# Patient Record
Sex: FEMALE | Race: BLACK OR AFRICAN AMERICAN | NOT HISPANIC OR LATINO | Employment: PART TIME | URBAN - METROPOLITAN AREA
[De-identification: names, ages, dates, MRNs, and addresses within clinical notes are randomized per-mention and may not be internally consistent; named-entity substitution may affect disease eponyms.]

---

## 2017-04-20 ENCOUNTER — THERAPY VISIT (OUTPATIENT)
Dept: PHYSICAL THERAPY | Facility: CLINIC | Age: 37
End: 2017-04-20
Payer: COMMERCIAL

## 2017-04-20 DIAGNOSIS — M79.671 RIGHT FOOT PAIN: Primary | ICD-10-CM

## 2017-04-20 PROCEDURE — 97161 PT EVAL LOW COMPLEX 20 MIN: CPT | Mod: GP | Performed by: PHYSICAL THERAPIST

## 2017-04-20 PROCEDURE — 97110 THERAPEUTIC EXERCISES: CPT | Mod: GP | Performed by: PHYSICAL THERAPIST

## 2017-04-20 NOTE — PROGRESS NOTES
[facility] Initial Evaluation    Subjective:    Patient is a 37 year old female presenting with rehab right ankle/foot hpi. The history is provided by the patient. A  was used.   Arielle Paul is a 37 year old female with a right foot condition.  Condition occurred with:  Insidious onset.  Condition occurred: for unknown reasons.    Pain began about 2 months ago. PT referral on 4/5/17.      Radiates to:  No radiation.  Pain is described as sharp and is intermittent and reported as 8/10 (at worst).   Pain is worse in the A.M..  Symptoms are exacerbated by walking, weight bearing and standing and relieved by activity/movement and rest.  Since onset symptoms are gradually worsening.  Special tests:  X-ray (negative for fracture).      General health as reported by patient is excellent.  Pertinent medical history includes:  None and multiple sclerosis.  Medical allergies: no.  Other surgeries include:  None reported.  Current medications:  None as reported by patient.  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  Primary job tasks include:  Repetitive tasks, prolonged standing and lifting.    Barriers include:  None as reported by patient.    Red flags:  None as reported by patient.                        Objective:    Standing Alignment:                Ankle/Foot:  Pes planus L and pes planus R        Flexibility/Screens:       Lower Extremity:      Decreased right lower extremity flexibility:  Gastroc and Soleus          Ankle/Foot Evaluation  ROM:  AROM is normal.      Strength is normal.  LIGAMENT TESTING: normal              SPECIAL TESTS: normal    PALPATION:     Right ankle tenderness present at:   gastroc/soleus and plantar fascia  EDEMA: normal          MOBILITY TESTING: normal                                                          Knee Evaluation:  ROM:  AROM: normal  Strength:   Normal                            General     ROS    Assessment/Plan:      Patient is a 37 year old female with R foot complaints.    Patient has the following significant findings with corresponding treatment plan.                Diagnosis 1:  R plantar fasciitis  Pain -  hot/cold therapy, US, manual therapy, splint/taping/bracing/orthotics, self management, education and home program  Decreased ROM/flexibility - manual therapy, therapeutic exercise and home program  Decreased strength - therapeutic exercise, therapeutic activities and home program    Therapy Evaluation Codes:   1) History comprised of:   Personal factors that impact the plan of care:      None.    Comorbidity factors that impact the plan of care are:      None.     Medications impacting care: None.  2) Examination of Body Systems comprised of:   Body structures and functions that impact the plan of care:      Ankle, Knee and foot.   Activity limitations that impact the plan of care are:      Standing and Walking.  3) Clinical presentation characteristics are:   Stable/Uncomplicated.  4) Decision-Making    Low complexity using standardized patient assessment instrument and/or measureable assessment of functional outcome.  Cumulative Therapy Evaluation is: Low complexity.    Previous and current functional limitations:  (See Goal Flow Sheet for this information)    Short term and Long term goals: (See Goal Flow Sheet for this information)     Communication ability:  Patient has an  for communication clarity.  Treatment Explanation - The following has been discussed with the patient:   RX ordered/plan of care  Anticipated outcomes  Possible risks and side effects  This patient would benefit from PT intervention to resume normal activities.   Rehab potential is good.    Frequency:  1 X week, once daily  Duration:  for 6 weeks  Discharge Plan:  Achieve all LTG.  Independent in home treatment program.  Reach maximal therapeutic  benefit.    Please refer to the daily flowsheet for treatment today, total treatment time and time spent performing 1:1 timed codes.

## 2017-04-20 NOTE — LETTER
[facility]  [address]  [place] [address]  [phone]    2017    Re: Arielle Paul   :   1980  MRN:  [id number]   REFERRING PHYSICIAN:   Dinesh Andino  [facility]  Date of Initial Evaluation:2017  Visits:1  Rxs Used: 1  Reason for Referral:  Right foot pain  EVALUATION SUMMARY  [facility] Initial Evaluation  Subjective:  Patient is a 37 year old female presenting with rehab right ankle/foot hpi. The history is provided by the patient. A  was used.   Arielle Paul is a 37 year old female with a right foot condition.  Condition occurred with:  Insidious onset.  Condition occurred: for unknown reasons.    Pain began about 2 months ago. PT referral on 17.    Radiates to:  No radiation.  Pain is described as sharp and is intermittent and reported as 8/10 (at worst).   Pain is worse in the A.M..  Symptoms are exacerbated by walking, weight bearing and standing and relieved by activity/movement and rest.  Since onset symptoms are gradually worsening.  Special tests:  X-ray (negative for fracture).      General health as reported by patient is excellent.  Pertinent medical history includes:  None and multiple sclerosis.  Medical allergies: no.  Other surgeries include:  None reported.  Current medications:  None as reported by patient.  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  Primary job tasks include:  Repetitive tasks, prolonged standing and lifting.  Barriers include:  None as reported by patient.  Red flags:  None as reported by patient.  Objective:  Standing Alignment:    Ankle/Foot:  Pes planus L and pes planus R  Flexibility/Screens:   Lower Extremity:  Decreased right lower extremity flexibility:  Gastroc and Soleus  Ankle/Foot Evaluation  ROM:  AROM is normal.  Strength is  normal.  LIGAMENT TESTING: normal  SPECIAL TESTS: normal  PALPATION:   Right ankle tenderness present at:   gastroc/soleus and plantar fascia  EDEMA: normal     MOBILITY TESTING: normal  Knee Evaluation:  ROM:  AROM: normal  Strength:  Normal  Re: Arielle Paul   :   1980  General   ROS  Assessment/Plan:    Patient is a 37 year old female with R foot complaints.    Patient has the following significant findings with corresponding treatment plan.                Diagnosis 1:  R plantar fasciitis  Pain -  hot/cold therapy, US, manual therapy, splint/taping/bracing/orthotics, self management, education and home program  Decreased ROM/flexibility - manual therapy, therapeutic exercise and home program  Decreased strength - therapeutic exercise, therapeutic activities and home program  Therapy Evaluation Codes:   1) History comprised of:   Personal factors that impact the plan of care:      None.    Comorbidity factors that impact the plan of care are:      None.     Medications impacting care: None.  2) Examination of Body Systems comprised of:   Body structures and functions that impact the plan of care:      Ankle, Knee and foot.   Activity limitations that impact the plan of care are:      Standing and Walking.  3) Clinical presentation characteristics are:   Stable/Uncomplicated.  4) Decision-Making    Low complexity using standardized patient assessment instrument and/or measureable assessment of functional outcome.  Cumulative Therapy Evaluation is: Low complexity.  Previous and current functional limitations:  (See Goal Flow Sheet for this information)    Short term and Long term goals: (See Goal Flow Sheet for this information)   Communication ability:  Patient has an  for communication clarity.  Treatment Explanation - The following has been discussed with the patient:   RX ordered/plan of care  Anticipated outcomes  Possible risks and side effects  This patient would benefit from PT  intervention to resume normal activities.   Rehab potential is good.  Frequency:  1 X week, once daily  Duration:  for 6 weeks  Discharge Plan:  Achieve all LTG.  Independent in home treatment program.  Reach maximal therapeutic benefit.  Thank you for your referral.  INQUIRIES  Therapist: Dana Alanis DPT   [facility]  [address] [facility] [address]  Phone: [phone]  Fax: [phone]

## 2017-04-20 NOTE — MR AVS SNAPSHOT
"              After Visit Summary   4/20/2017    Arielle Paul    MRN: [id number]           Patient Information     Date Of Birth          1980        Visit Information        Provider Department      4/20/2017 12:10 PM Dana Alanis, PT [facility]        Today's Diagnoses     Right foot pain    -  1       Follow-ups after your visit        Your next 10 appointments already scheduled     Apr 27, 2017  8:10 AM CDT   REYES Extremity with Dana Alanis PT   [facility]    [address] [facility] [address] [phone]            May 04, 2017 12:10 PM CDT   REYES Extremity with Dana Alanis PT   [facility])    [address] [facility] [id number] [phone]              Who to contact     If you have questions or need follow up information about today's clinic visit or your schedule please contact [facility] directly at [phone].  Normal or non-critical lab and imaging results will be communicated to you by [employer], letter or phone within 4 business days after the clinic has received the results. If you do not hear from us within 7 days, please contact the clinic through [email or web address] or phone. If you have a critical or abnormal lab result, we will notify you by phone as soon as possible.  Submit refill requests through [employer] or call your pharmacy and they will forward the refill request to us. Please allow 3 business days for your refill to be completed.          Additional Information About Your Visit        [employer] Information     Skulpt lets you send messages to your doctor, view your test results, renew your prescriptions, schedule appointments and more. To sign up, go to [email or web address] . Click on \"Log in\" on the left side of the screen, which will take you to the " "Welcome page. Then click on \"Sign up Now\" on the right side of the page.     You will be asked to enter the access code listed below, as well as some personal information. Please follow the directions to create your username and password.     Your access code is: [email or web address]  Expires: 2017  1:07 PM     Your access code will  in 90 days. If you need help or a new code, please call your [facility] or [phone].        Care EveryWhere ID     This is your Care EveryWhere ID. This could be used by other organizations to access your [facility] medical records  [id number]         Blood Pressure from Last 3 Encounters:   10/02/16 118/68   09/11/15 109/55   01/29/15 105/70    Weight from Last 3 Encounters:   01/29/15 97.9 kg (215 lb 12.8 oz)   14 91.2 kg (201 lb)   14 91.6 kg (202 lb)              We Performed the Following     HC PT EVAL, LOW COMPLEXITY     REYES INITIAL EVAL REPORT     THERAPEUTIC EXERCISES        Primary Care Provider Office Phone # Fax #    Jayna Michelle [phone]       [facility]  [place] [id number]        Thank you!     Thank you for choosing [facility]  for your care. Our goal is always to provide you with excellent care. Hearing back from our patients is one way we can continue to improve our services. Please take a few minutes to complete the written survey that you may receive in the mail after your visit with us. Thank you!             Your Updated Medication List - Protect others around you: Learn how to safely use, store and throw away your medicines at www.disposemymeds.org.          This list is accurate as of: 17  1:07 PM.  Always use your most recent med list.                   Brand Name Dispense Instructions for use    cholecalciferol 22096 UNITS capsule    VITAMIN D3    4 capsule    Take 1 capsule (50,000 Units) by mouth once a week       DOXYCYCLINE CALCIUM PO      Take " 100 mg by mouth 2 times daily       ferrous sulfate 325 (65 FE) MG tablet    IRON    90 tablet    Take 1 tablet (325 mg) by mouth daily (with breakfast)       SUMAtriptan 50 MG tablet    IMITREX    18 tablet    Take 1 tablet (50 mg) by mouth at onset of headache for migraine May repeat dose in 2 hours.  Do not exceed 200 mg in 24 hours       vitamin D 00736 UNIT capsule    ERGOCALCIFEROL    4 capsule    Take 1 capsule (50,000 Units) by mouth once a week

## 2017-04-27 ENCOUNTER — THERAPY VISIT (OUTPATIENT)
Dept: PHYSICAL THERAPY | Facility: CLINIC | Age: 37
End: 2017-04-27
Payer: COMMERCIAL

## 2017-04-27 DIAGNOSIS — M79.671 RIGHT FOOT PAIN: ICD-10-CM

## 2017-04-27 PROCEDURE — 97110 THERAPEUTIC EXERCISES: CPT | Mod: GP | Performed by: PHYSICAL THERAPIST

## 2017-04-27 PROCEDURE — 97035 APP MDLTY 1+ULTRASOUND EA 15: CPT | Mod: GP | Performed by: PHYSICAL THERAPIST

## 2017-05-04 ENCOUNTER — THERAPY VISIT (OUTPATIENT)
Dept: PHYSICAL THERAPY | Facility: CLINIC | Age: 37
End: 2017-05-04
Payer: COMMERCIAL

## 2017-05-04 DIAGNOSIS — M79.671 RIGHT FOOT PAIN: ICD-10-CM

## 2017-05-04 DIAGNOSIS — R26.9 IMPAIRED GAIT: Primary | ICD-10-CM

## 2017-05-04 PROCEDURE — 97116 GAIT TRAINING THERAPY: CPT | Mod: GP | Performed by: PHYSICAL THERAPIST

## 2017-05-04 PROCEDURE — 97035 APP MDLTY 1+ULTRASOUND EA 15: CPT | Mod: GP | Performed by: PHYSICAL THERAPIST

## 2017-05-04 PROCEDURE — 97110 THERAPEUTIC EXERCISES: CPT | Mod: GP | Performed by: PHYSICAL THERAPIST

## 2017-05-04 NOTE — MR AVS SNAPSHOT
"              After Visit Summary   5/4/2017    Arielle Paul    MRN: [id number]           Patient Information     Date Of Birth          1980        Visit Information        Provider Department      5/4/2017 12:10 PM [facility]is, [facility]        Today's Diagnoses     Impaired gait    -  1    Right foot pain           Follow-ups after your visit        Your next 10 appointments already scheduled     May 19, 2017  8:10 AM CDT   REYES Extremity with Dana Alanis PT   [facility])    [address]  [facility] [address] [phone]            May 26, 2017 12:00 PM CDT   REYES Extremity with Dana Alanis PT   [facility])    [address] [facility] [id number] [phone]              Who to contact     If you have questions or need follow up information about today's clinic visit or your schedule please contact [facility] directly at [phone].  Normal or non-critical lab and imaging results will be communicated to you by [employer], letter or phone within 4 business days after the clinic has received the results. If you do not hear from us within 7 days, please contact the clinic through [employer] or phone. If you have a critical or abnormal lab result, we will notify you by phone as soon as possible.  Submit refill requests through [employer] or call your pharmacy and they will forward the refill request to us. Please allow 3 business days for your refill to be completed.          Additional Information About Your Visit        [employer] Information     [employer] lets you send messages to your doctor, view your test results, renew your prescriptions, schedule appointments and more. To sign up, go to [email or web address] . Click on \"Log in\" on the left side of the screen, which " "will take you to the Welcome page. Then click on \"Sign up Now\" on the right side of the page.     You will be asked to enter the access code listed below, as well as some personal information. Please follow the directions to create your username and password.     Your access code is: [email or web address]  Expires: 2017  1:07 PM     Your access code will  in 90 days. If you need help or a new code, please call your [facility] clinic or [phone].        Care EveryWhere ID     This is your Care EveryWhere ID. This could be used by other organizations to access your [facility] medical records  [id number]         Blood Pressure from Last 3 Encounters:   10/02/16 118/68   09/11/15 109/55   01/29/15 105/70    Weight from Last 3 Encounters:   01/29/15 97.9 kg (215 lb 12.8 oz)   14 91.2 kg (201 lb)   14 91.6 kg (202 lb)              We Performed the Following     GAIT TRAINING THERAPY     THERAPEUTIC EXERCISES     ULTRASOUND THERAPY        Primary Care Provider Office Phone # Fax #    Jaynaconrado Michelle [phone]       [facility]  [address] [address]        Thank you!     Thank you for choosing [facility]  for your care. Our goal is always to provide you with excellent care. Hearing back from our patients is one way we can continue to improve our services. Please take a few minutes to complete the written survey that you may receive in the mail after your visit with us. Thank you!             Your Updated Medication List - Protect others around you: Learn how to safely use, store and throw away your medicines at www.disposemymeds.org.          This list is accurate as of: 17  1:53 PM.  Always use your most recent med list.                   Brand Name Dispense Instructions for use    cholecalciferol 39019 UNITS capsule    VITAMIN D3    4 capsule    Take 1 capsule (50,000 Units) by mouth once a week       DOXYCYCLINE CALCIUM PO    "   Take 100 mg by mouth 2 times daily       ferrous sulfate 325 (65 FE) MG tablet    IRON    90 tablet    Take 1 tablet (325 mg) by mouth daily (with breakfast)       SUMAtriptan 50 MG tablet    IMITREX    18 tablet    Take 1 tablet (50 mg) by mouth at onset of headache for migraine May repeat dose in 2 hours.  Do not exceed 200 mg in 24 hours       vitamin D 17307 UNIT capsule    ERGOCALCIFEROL    4 capsule    Take 1 capsule (50,000 Units) by mouth once a week

## 2017-08-10 ENCOUNTER — OFFICE VISIT (OUTPATIENT)
Dept: FAMILY MEDICINE | Facility: CLINIC | Age: 37
End: 2017-08-10
Payer: COMMERCIAL

## 2017-08-10 VITALS
HEART RATE: 71 BPM | DIASTOLIC BLOOD PRESSURE: 75 MMHG | HEIGHT: 65 IN | OXYGEN SATURATION: 99 % | TEMPERATURE: 97.9 F | BODY MASS INDEX: 36.99 KG/M2 | WEIGHT: 222 LBS | SYSTOLIC BLOOD PRESSURE: 105 MMHG

## 2017-08-10 DIAGNOSIS — R05.9 COUGH: Primary | ICD-10-CM

## 2017-08-10 PROCEDURE — 99213 OFFICE O/P EST LOW 20 MIN: CPT | Performed by: NURSE PRACTITIONER

## 2017-08-10 RX ORDER — GUAIFENESIN/DEXTROMETHORPHAN 100-10MG/5
5 SYRUP ORAL EVERY 4 HOURS PRN
Qty: 560 ML | Refills: 0 | Status: SHIPPED | OUTPATIENT
Start: 2017-08-10 | End: 2018-04-25

## 2017-08-10 NOTE — PROGRESS NOTES
"  SUBJECTIVE:                                                    Arielle Paul is a 37 year old female who presents to clinic today for the following health issues:      Patient has been having a cough for about a week now. No congestion. No runny nose. No sore throat either. Patient states it gets worse at night.    Cough worsening over past week  Worse at bedtime  Denies SOB, wheezing  Up at night coughing  Wakes up coughing  Cough is dry  Nonsmoker  No history asthma  Denies fever, nausea, vomiting  Denies throat pain, nasal congestion, headache  Reports similar symptoms in the past. Treated with Zpak. Requests refill          Problem list and histories reviewed & adjusted, as indicated.  Additional history: none    Patient Active Problem List   Diagnosis     Anemia     Perforated tympanic membrane     Vitamin D deficiency     Edema     Contraception, generic surveillance     Migraine     Polyarthritis     Thyroid nodule     Corns and callosities     Right foot pain     Impaired gait     Past Surgical History:   Procedure Laterality Date     NO HISTORY OF SURGERY         Social History   Substance Use Topics     Smoking status: Never Smoker     Smokeless tobacco: Never Used     Alcohol use No     Family History   Problem Relation Age of Onset     Family History Negative No family hx of      Autoimmune Disease No family hx of              Reviewed and updated as needed this visit by clinical staffTobacco  Allergies  Meds  Med Hx  Surg Hx  Fam Hx  Soc Hx      Reviewed and updated as needed this visit by Provider         ROS:  Constitutional, HEENT, cardiovascular, pulmonary, gi and gu systems are negative, except as otherwise noted.      OBJECTIVE:   /75 (BP Location: Left arm, Patient Position: Chair, Cuff Size: Adult Regular)  Pulse 71  Temp 97.9  F (36.6  C) (Oral)  Ht 5' 4.76\" (1.645 m)  Wt 222 lb (100.7 kg)  SpO2 99%  BMI 37.21 kg/m2  Body mass index is 37.21 kg/(m^2).  GENERAL: healthy, " alert and no distress  HENT: ear canals and TM's normal, nose and mouth without ulcers or lesions  NECK: no adenopathy, no asymmetry, masses, or scars and thyroid normal to palpation  RESP: lungs clear to auscultation - no rales, rhonchi or wheezes  CV: regular rate and rhythm, normal S1 S2, no S3 or S4, no murmur, click or rub, no peripheral edema and peripheral pulses strong    Diagnostic Test Results:  none     ASSESSMENT/PLAN:       ICD-10-CM    1. Cough R05 guaiFENesin-dextromethorphan (ROBITUSSIN DM) 100-10 MG/5ML syrup       Physical exam unremarkable. VSS, respiratory exam normal. Antibiotic treatment is not indicated.   I explained symptoms most likely viral in nature. Recommend symptomatic treatment. If cough not showing improvement after 10-14 days, could consider antibiotic treatment.  Respiratory exam was not really consistent with bronchitis. No coughing or wheezing heard. Also consider allergies vs. Silent GERD  She appeared upset and requested Zpak several times stating worked in the past.   I attempted to educate on viral vs bacterial infections, the side effects of Abx and drug resistant antibiotics  Recommend rest, steam inhalation, over the counter medications as needed  Warned of sedating potential with cough syrup    WILLIAN Bolden CNP  [facility]

## 2017-08-10 NOTE — NURSING NOTE
"Chief Complaint   Patient presents with     Cough     Health Maintenance     pap       Initial /75 (BP Location: Left arm, Patient Position: Chair, Cuff Size: Adult Regular)  Pulse 71  Temp 97.9  F (36.6  C) (Oral)  Ht 5' 4.76\" (1.645 m)  Wt 222 lb (100.7 kg)  SpO2 99%  BMI 37.21 kg/m2 Estimated body mass index is 37.21 kg/(m^2) as calculated from the following:    Height as of this encounter: 5' 4.76\" (1.645 m).    Weight as of this encounter: 222 lb (100.7 kg).  Medication Reconciliation: complete   Mary See CHACHO Bloom      "

## 2017-08-10 NOTE — MR AVS SNAPSHOT
"              After Visit Summary   8/10/2017    Arielle Paul    MRN: [id number]           Patient Information     Date Of Birth          1980        Visit Information        Provider Department      8/10/2017 1:40 PM Maria T Shafer APRN CNP; LANGUAGE [facility]        Today's Diagnoses     Cough    -  1       Follow-ups after your visit        Who to contact     If you have questions or need follow up information about today's clinic visit or your schedule please contact [facility] directly at [phone].  Normal or non-critical lab and imaging results will be communicated to you by [employer], letter or phone within 4 business days after the clinic has received the results. If you do not hear from us within 7 days, please contact the clinic through [employer] or phone. If you have a critical or abnormal lab result, we will notify you by phone as soon as possible.  Submit refill requests through [employer] or call your pharmacy and they will forward the refill request to us. Please allow 3 business days for your refill to be completed.          Additional Information About Your Visit        MyChart Information     [employer] lets you send messages to your doctor, view your test results, renew your prescriptions, schedule appointments and more. To sign up, go to [email or web address] . Click on \"Log in\" on the left side of the screen, which will take you to the Welcome page. Then click on \"Sign up Now\" on the right side of the page.     You will be asked to enter the access code listed below, as well as some personal information. Please follow the directions to create your username and password.     Your access code is: [email or web address]  Expires: 2017  2:28 PM     Your access code will  in 90 days. If you need help or a new code, please call your [facility] or [phone].        Care EveryWhere ID     This is your Care EveryWhere ID. This " "could be used by other organizations to access your [facility] medical records  [id number]        Your Vitals Were     Pulse Temperature Height Pulse Oximetry BMI (Body Mass Index)       71 97.9  F (36.6  C) (Oral) 5' 4.76\" (1.645 m) 99% 37.21 kg/m2        Blood Pressure from Last 3 Encounters:   08/10/17 105/75   10/02/16 118/68   09/11/15 109/55    Weight from Last 3 Encounters:   08/10/17 222 lb (100.7 kg)   01/29/15 215 lb 12.8 oz (97.9 kg)   07/16/14 201 lb (91.2 kg)              Today, you had the following     No orders found for display         Today's Medication Changes          These changes are accurate as of: 8/10/17  2:28 PM.  If you have any questions, ask your nurse or doctor.               Start taking these medicines.        Dose/Directions    guaiFENesin-dextromethorphan 100-10 MG/5ML syrup   Commonly known as:  ROBITUSSIN DM   Used for:  Cough   Started by:  Maria T Shafer APRN CNP        Dose:  5 mL   Take 5 mLs by mouth every 4 hours as needed for cough   Quantity:  560 mL   Refills:  0            Where to get your medicines      These medications were sent to [facility] [place], [place] - [address]  [address], [facility] [address]     Phone:  [phone]     guaiFENesin-dextromethorphan 100-10 MG/5ML syrup                Primary Care Provider Office Phone # Fax #    Jayna Michelle [phone]       [address] [address]        Equal Access to Services     [facility]: Hadii karri hopper Somed, waaxda luqadaha, qaybta kaalmada maya, anushka sloan. So [facility] [phone].    ATENCIÓN: Si habla español, tiene a yang disposición servicios gratuitos de asistencia lingüística. Reina barnhart [phone].    We comply with applicable federal civil rights laws and [place] laws. We do not discriminate on the basis of race, color, national origin, age, disability sex, " sexual orientation or gender identity.            Thank you!     Thank you for choosing [facility]  for your care. Our goal is always to provide you with excellent care. Hearing back from our patients is one way we can continue to improve our services. Please take a few minutes to complete the written survey that you may receive in the mail after your visit with us. Thank you!             Your Updated Medication List - Protect others around you: Learn how to safely use, store and throw away your medicines at www.disposemymeds.org.          This list is accurate as of: 8/10/17  2:28 PM.  Always use your most recent med list.                   Brand Name Dispense Instructions for use Diagnosis    cholecalciferol 49465 UNITS capsule    VITAMIN D3    4 capsule    Take 1 capsule (50,000 Units) by mouth once a week    Vitamin D deficiency       DOXYCYCLINE CALCIUM PO      Take 100 mg by mouth 2 times daily        ferrous sulfate 325 (65 FE) MG tablet    IRON    90 tablet    Take 1 tablet (325 mg) by mouth daily (with breakfast)    Iron deficiency anemia, unspecified       guaiFENesin-dextromethorphan 100-10 MG/5ML syrup    ROBITUSSIN DM    560 mL    Take 5 mLs by mouth every 4 hours as needed for cough    Cough       SUMAtriptan 50 MG tablet    IMITREX    18 tablet    Take 1 tablet (50 mg) by mouth at onset of headache for migraine May repeat dose in 2 hours.  Do not exceed 200 mg in 24 hours    Chronic migraine without aura, with intractable migraine, so stated, without mention of status migrainosus       vitamin D 47652 UNIT capsule    ERGOCALCIFEROL    4 capsule    Take 1 capsule (50,000 Units) by mouth once a week    Vitamin D deficiency

## 2017-08-27 ENCOUNTER — HEALTH MAINTENANCE LETTER (OUTPATIENT)
Age: 37
End: 2017-08-27

## 2018-04-25 ENCOUNTER — OFFICE VISIT (OUTPATIENT)
Dept: FAMILY MEDICINE | Facility: CLINIC | Age: 38
End: 2018-04-25
Payer: COMMERCIAL

## 2018-04-25 VITALS
DIASTOLIC BLOOD PRESSURE: 62 MMHG | OXYGEN SATURATION: 98 % | HEART RATE: 71 BPM | WEIGHT: 232 LBS | BODY MASS INDEX: 39.61 KG/M2 | TEMPERATURE: 97.8 F | HEIGHT: 64 IN | SYSTOLIC BLOOD PRESSURE: 101 MMHG

## 2018-04-25 DIAGNOSIS — H66.92 LEFT OTITIS MEDIA, UNSPECIFIED OTITIS MEDIA TYPE: ICD-10-CM

## 2018-04-25 DIAGNOSIS — R05.9 COUGH: Primary | ICD-10-CM

## 2018-04-25 PROCEDURE — 99213 OFFICE O/P EST LOW 20 MIN: CPT | Performed by: FAMILY MEDICINE

## 2018-04-25 RX ORDER — AZITHROMYCIN 250 MG/1
TABLET, FILM COATED ORAL
Qty: 6 TABLET | Refills: 0 | Status: SHIPPED | OUTPATIENT
Start: 2018-04-25 | End: 2018-05-25

## 2018-04-25 RX ORDER — MULTIVIT-MIN/IRON/FOLIC ACID/K 18-600-40
1 CAPSULE ORAL DAILY
COMMUNITY
End: 2019-01-10

## 2018-04-25 NOTE — PROGRESS NOTES
"  SUBJECTIVE:   Arielle Paul is a 38 year old female who presents to clinic today for the following health issues:  {Provider please address medication reconciliation discrepancies--rooming staff please delete if no med/rec issues}    ENT Symptoms             Symptoms: cc Present Absent Comment   Fever/Chills       Fatigue       Muscle Aches       Eye Irritation       Sneezing       Nasal Andrea/Drg       Sinus Pressure/Pain       Loss of smell       Dental pain       Sore Throat       Swollen Glands       Ear Pain/Fullness       Cough       Wheeze       Chest Pain       Shortness of breath       Rash       Other         Symptom duration:  ***   Symptom severity:  ***   Treatments tried:  ***   Contacts:  ***         {additional problems for provider to add:689387}    Problem list and histories reviewed & adjusted, as indicated.  Additional history: {NONE - AS DOCUMENTED:370435::\"as documented\"}    {HIST REVIEW/ LINKS 2:809300}    Reviewed and updated as needed this visit by clinical staff       Reviewed and updated as needed this visit by Provider         {PROVIDER CHARTING PREFERENCE:566055}  "

## 2018-04-25 NOTE — MR AVS SNAPSHOT
"              After Visit Summary   2018    Arielle Paul    MRN: [id number]           Patient Information     Date Of Birth          1980        Visit Information        Provider Department      2018 10:00 AM Alix Mora MD; MULTILINGUAL [facility]        Today's Diagnoses     Cough    -  1    Left otitis media, unspecified otitis media type           Follow-ups after your visit        Who to contact     If you have questions or need follow up information about today's clinic visit or your schedule please contact [facility] directly at [phone].  Normal or non-critical lab and imaging results will be communicated to you by MyChart, letter or phone within 4 business days after the clinic has received the results. If you do not hear from us within 7 days, please contact the clinic through [employer] or phone. If you have a critical or abnormal lab result, we will notify you by phone as soon as possible.  Submit refill requests through [employer] or call your pharmacy and they will forward the refill request to us. Please allow 3 business days for your refill to be completed.          Additional Information About Your Visit        MyChart Information     [employer] lets you send messages to your doctor, view your test results, renew your prescriptions, schedule appointments and more. To sign up, go to [email or web address] . Click on \"Log in\" on the left side of the screen, which will take you to the Welcome page. Then click on \"Sign up Now\" on the right side of the page.     You will be asked to enter the access code listed below, as well as some personal information. Please follow the directions to create your username and password.     Your access code is: [email or web address]  Expires: 2018 10:57 AM     Your access code will  in 90 days. If you need help or a new code, please call your [facility] or [phone].        Care " "EveryWhere ID     This is your Care EveryWhere ID. This could be used by other organizations to access your [facility] medical records  [id number]        Your Vitals Were     Pulse Temperature Height Last Period Pulse Oximetry BMI (Body Mass Index)    71 97.8  F (36.6  C) (Oral) 5' 4.25\" (1.632 m) 04/20/2018 (Exact Date) 98% 39.51 kg/m2       Blood Pressure from Last 3 Encounters:   04/25/18 101/62   08/10/17 105/75   10/02/16 118/68    Weight from Last 3 Encounters:   04/25/18 232 lb (105.2 kg)   08/10/17 222 lb (100.7 kg)   01/29/15 215 lb 12.8 oz (97.9 kg)              Today, you had the following     No orders found for display         Today's Medication Changes          These changes are accurate as of 4/25/18 10:57 AM.  If you have any questions, ask your nurse or doctor.               Start taking these medicines.        Dose/Directions    azithromycin 250 MG tablet   Commonly known as:  ZITHROMAX   Used for:  Cough, Left otitis media, unspecified otitis media type   Started by:  Alix Mora MD        Two tablets first day, then one tablet daily for four days.   Quantity:  6 tablet   Refills:  0            Where to get your medicines      These medications were sent to [facility] - [place], [place] - [address]  [address], [facility] [address]     Phone:  [phone]     azithromycin 250 MG tablet                Primary Care Provider Office Phone # Fax #    Jayna Michelle [phone]       [address] [address]        Equal Access to Services     [facility] AH: Hadii karri Jones, waaxda luqadaha, qaybta kaalmada anushka trejo. So [facility] [phone].    ATENCIÓN: Si habla español, tiene a yang disposición servicios gratuitos de asistencia lingüística. Llame al [phone].    We comply with applicable federal civil rights laws and [place] laws. We do not " discriminate on the basis of race, color, national origin, age, disability, sex, sexual orientation, or gender identity.            Thank you!     Thank you for choosing [facility]  for your care. Our goal is always to provide you with excellent care. Hearing back from our patients is one way we can continue to improve our services. Please take a few minutes to complete the written survey that you may receive in the mail after your visit with us. Thank you!             Your Updated Medication List - Protect others around you: Learn how to safely use, store and throw away your medicines at www.disposemymeds.org.          This list is accurate as of 4/25/18 10:57 AM.  Always use your most recent med list.                   Brand Name Dispense Instructions for use Diagnosis    azithromycin 250 MG tablet    ZITHROMAX    6 tablet    Two tablets first day, then one tablet daily for four days.    Cough, Left otitis media, unspecified otitis media type       ferrous sulfate 325 (65 Fe) MG tablet    IRON    90 tablet    Take 1 tablet (325 mg) by mouth daily (with breakfast)    Iron deficiency anemia, unspecified       Vitamin D (Cholecalciferol) 1000 units Tabs      Take 1 tablet by mouth daily

## 2018-04-25 NOTE — PROGRESS NOTES
SUBJECTIVE:   Arielle Paul is a 38 year old female who presents to clinic today for the following health issues:      ED/UC Followup:  Facility:  [facility]  Date of visit: 4/23/18  Reason for visit: Cough  Current Status: Patient was given Augmentin 875-125 and doesn't think it will help.     She was seen at [facility] 2 days ago.   She has ear infection and cough. Augmentin was prescribed.   Pt is worried about the antibiotic as she thinks she had some reaction with it in past. Had azithromycin in the past and she is here for z pack prescription.  She did not  Augmentin prescription.      Problem list and histories reviewed & adjusted, as indicated.  Additional history: as documented    Patient Active Problem List   Diagnosis     Anemia     Perforated tympanic membrane     Vitamin D deficiency     Edema     Contraception, generic surveillance     Migraine     Polyarthritis     Thyroid nodule     Corns and callosities     Right foot pain     Impaired gait     Past Surgical History:   Procedure Laterality Date     NO HISTORY OF SURGERY         Social History   Substance Use Topics     Smoking status: Never Smoker     Smokeless tobacco: Never Used     Alcohol use No     Family History   Problem Relation Age of Onset     Family History Negative No family hx of      Autoimmune Disease No family hx of          Current Outpatient Prescriptions   Medication Sig Dispense Refill     ferrous sulfate (IRON) 325 (65 FE) MG tablet Take 1 tablet (325 mg) by mouth daily (with breakfast) 90 tablet 1     Vitamin D, Cholecalciferol, 1000 units TABS Take 1 tablet by mouth daily       No Known Allergies  Recent Labs   Lab Test  05/29/14   1302  03/21/13   1205  03/06/13   1140   A1C  5.8   --    --    LDL  91   --    --    HDL  52   --    --    TRIG  69   --    --    ALT   --    --   30   CR   --    --   0.71   GFRESTIMATED   --    --   >90   GFRESTBLACK   --    --   >90   POTASSIUM   --    --   4.2   TSH   --    "1.58   --       BP Readings from Last 3 Encounters:   04/25/18 101/62   08/10/17 105/75   10/02/16 118/68    Wt Readings from Last 3 Encounters:   04/25/18 232 lb (105.2 kg)   08/10/17 222 lb (100.7 kg)   01/29/15 215 lb 12.8 oz (97.9 kg)                  Labs reviewed in EPIC    Reviewed and updated as needed this visit by clinical staff  Tobacco  Allergies  Med Hx  Surg Hx  Fam Hx  Soc Hx      Reviewed and updated as needed this visit by Provider         ROS:  Constitutional, HEENT, cardiovascular, pulmonary, gi and gu systems are negative, except as otherwise noted.    OBJECTIVE:     /62 (BP Location: Right arm, Patient Position: Sitting, Cuff Size: Adult Large)  Pulse 71  Temp 97.8  F (36.6  C) (Oral)  Ht 5' 4.25\" (1.632 m)  Wt 232 lb (105.2 kg)  LMP 04/20/2018 (Exact Date)  SpO2 98%  BMI 39.51 kg/m2  Body mass index is 39.51 kg/(m^2).  GENERAL: healthy, alert and no distress  HENT: ear canals: normal. and TM's normal: right TM: minimal erythema, dull light reflex. Left TM: erythema ++. No bulging. , nose and mouth without ulcers or lesions  NECK: no adenopathy, no asymmetry, masses, or scars and thyroid normal to palpation  RESP: lungs clear to auscultation - no rales, rhonchi or wheezes  CV: regular rate and rhythm, normal S1 S2, no S3 or S4    ASSESSMENT/PLAN:         ICD-10-CM    1. Cough R05 azithromycin (ZITHROMAX) 250 MG tablet   2. Left otitis media, unspecified otitis media type H66.92 azithromycin (ZITHROMAX) 250 MG tablet     Pt is advised to schedule appointment to establish care with provider at our clinic as she desires to come here.     Alix Mora MD  [facility]  "

## 2018-05-25 ENCOUNTER — OFFICE VISIT (OUTPATIENT)
Dept: FAMILY MEDICINE | Facility: CLINIC | Age: 38
End: 2018-05-25
Payer: COMMERCIAL

## 2018-05-25 VITALS
SYSTOLIC BLOOD PRESSURE: 104 MMHG | TEMPERATURE: 98.5 F | HEART RATE: 76 BPM | DIASTOLIC BLOOD PRESSURE: 70 MMHG | WEIGHT: 231.6 LBS | BODY MASS INDEX: 39.45 KG/M2

## 2018-05-25 DIAGNOSIS — M54.50 ACUTE LEFT-SIDED LOW BACK PAIN WITHOUT SCIATICA: Primary | ICD-10-CM

## 2018-05-25 PROCEDURE — 99214 OFFICE O/P EST MOD 30 MIN: CPT | Performed by: PHYSICIAN ASSISTANT

## 2018-05-25 RX ORDER — NAPROXEN 500 MG/1
500 TABLET ORAL 2 TIMES DAILY PRN
Qty: 60 TABLET | Refills: 0 | Status: SHIPPED | OUTPATIENT
Start: 2018-05-25 | End: 2018-10-31

## 2018-05-25 ASSESSMENT — PAIN SCALES - GENERAL: PAINLEVEL: SEVERE PAIN (7)

## 2018-05-25 NOTE — MR AVS SNAPSHOT
"              After Visit Summary   2018    Arielle Paul    MRN: [id number]           Patient Information     Date Of Birth          1980        Visit Information        Provider Department      2018 10:20 AM Kady Childress PA-C; PHONE,  [facility]        Today's Diagnoses     Acute left-sided low back pain without sciatica    -  1      Care Instructions    Can use heat or ice as needed for 20 minutes up to 3 times a day.               Follow-ups after your visit        Who to contact     If you have questions or need follow up information about today's clinic visit or your schedule please contact [facility] directly at [phone].  Normal or non-critical lab and imaging results will be communicated to you by MyChart, letter or phone within 4 business days after the clinic has received the results. If you do not hear from us within 7 days, please contact the clinic through MyChart or phone. If you have a critical or abnormal lab result, we will notify you by phone as soon as possible.  Submit refill requests through [employer] or call your pharmacy and they will forward the refill request to us. Please allow 3 business days for your refill to be completed.          Additional Information About Your Visit        MyChart Information     [employer] lets you send messages to your doctor, view your test results, renew your prescriptions, schedule appointments and more. To sign up, go to [email or web address] . Click on \"Log in\" on the left side of the screen, which will take you to the Welcome page. Then click on \"Sign up Now\" on the right side of the page.     You will be asked to enter the access code listed below, as well as some personal information. Please follow the directions to create your username and password.     Your access code is: [email or web address]  Expires: 2018 10:15 AM     Your access code will  in 90 days. If you need help " or a new code, please call your [facility] clinic or [phone].        Care EveryWhere ID     This is your Care EveryWhere ID. This could be used by other organizations to access your [facility] medical records  [id number]        Your Vitals Were     Pulse Temperature Breastfeeding? BMI (Body Mass Index)          76 98.5  F (36.9  C) (Oral) No 39.45 kg/m2         Blood Pressure from Last 3 Encounters:   05/25/18 104/70   04/25/18 101/62   08/10/17 105/75    Weight from Last 3 Encounters:   05/25/18 231 lb 9.6 oz (105.1 kg)   04/25/18 232 lb (105.2 kg)   08/10/17 222 lb (100.7 kg)              Today, you had the following     No orders found for display         Today's Medication Changes          These changes are accurate as of 5/25/18 10:36 AM.  If you have any questions, ask your nurse or doctor.               Start taking these medicines.        Dose/Directions    naproxen 500 MG tablet   Commonly known as:  NAPROSYN   Used for:  Acute left-sided low back pain without sciatica   Started by:  Kady Childress PA-C        Dose:  500 mg   Take 1 tablet (500 mg) by mouth 2 times daily as needed for moderate pain   Quantity:  60 tablet   Refills:  0       tiZANidine 4 MG tablet   Commonly known as:  ZANAFLEX   Used for:  Acute left-sided low back pain without sciatica   Started by:  Kady Childress PA-C        Dose:  4-8 mg   Take 1-2 tablets (4-8 mg) by mouth 3 times daily as needed for muscle spasms   Quantity:  30 tablet   Refills:  1            Where to get your medicines      These medications were sent to [facility] - [place], [place] - [address]  [address], [facility] [address]     Phone:  [phone]     naproxen 500 MG tablet    tiZANidine 4 MG tablet                Primary Care Provider Office Phone # Fax #    Jayna Michelle [phone]       [address] [address]        Equal Access to Services     KEN AMIN : Thelma  karri Jones, wadequan ohadaha, qaybta kajohn jerrysav, waxcarolyn sherrie chinchillajustinenick sierra luther. So [facility] [phone].    ATENCIÓN: Si habla ember, tiene a yang disposición servicios gratuitos de asistencia lingüística. Tiffanyame al [phone].    We comply with applicable federal civil rights laws and [place] laws. We do not discriminate on the basis of race, color, national origin, age, disability, sex, sexual orientation, or gender identity.            Thank you!     Thank you for choosing [facility]  for your care. Our goal is always to provide you with excellent care. Hearing back from our patients is one way we can continue to improve our services. Please take a few minutes to complete the written survey that you may receive in the mail after your visit with us. Thank you!             Your Updated Medication List - Protect others around you: Learn how to safely use, store and throw away your medicines at www.disposemymeds.org.          This list is accurate as of 5/25/18 10:36 AM.  Always use your most recent med list.                   Brand Name Dispense Instructions for use Diagnosis    ferrous sulfate 325 (65 Fe) MG tablet    IRON    90 tablet    Take 1 tablet (325 mg) by mouth daily (with breakfast)    Iron deficiency anemia, unspecified       naproxen 500 MG tablet    NAPROSYN    60 tablet    Take 1 tablet (500 mg) by mouth 2 times daily as needed for moderate pain    Acute left-sided low back pain without sciatica       tiZANidine 4 MG tablet    ZANAFLEX    30 tablet    Take 1-2 tablets (4-8 mg) by mouth 3 times daily as needed for muscle spasms    Acute left-sided low back pain without sciatica       Vitamin D (Cholecalciferol) 1000 units Tabs      Take 1 tablet by mouth daily

## 2018-05-25 NOTE — PROGRESS NOTES
SUBJECTIVE:   Arielle Paul is a 38 year old female who presents to clinic today for the following health issues:      Back Pain       Duration: 1 day        Specific cause: none    Description:   Location of pain: low back bilateral  Character of pain: sharp, dull ache and constant  Pain radiation:none  New numbness or weakness in legs, not attributed to pain:  no     Intensity: Currently 7/10, At its worst 10/10    History:   Pain interferes with job: No,   History of back problems: no prior back problems  Any previous MRI or X-rays: None  Sees a specialist for back pain:  No  Therapies tried without relief: acetaminophen (Tylenol)    Alleviating factors:   Improved by: none      Precipitating factors:  Worsened by: Lifting, Bending, Sitting and Lying Flat    Functional and Psychosocial Screen (Jyoti STarT Back):      Not performed today          Accompanying Signs & Symptoms:  Risk of Fracture:  None  Risk of Cauda Equina:  None  Risk of Infection:  None  Risk of Cancer:  None  Risk of Ankylosing Spondylitis:  Onset at age <35, male, AND morning back stiffness. no       The back pain is worsening. Took 600mg of ibuprofen 2 times and not helpful. Did not take   She was cleaning the bathroom and when she got up she had pain.   Hard to get out of bed this morning.   No numbness/tignling in the legs.   No urinary or bowel issues. Pain when sitting on the toilet.   No heat or ice.   No history of a back injury. No history of back pain.        Patient is a relatively new patient to our clinic. Had been being seen at [facility]. She is transferring care to our clinic. Needs a pap smear and physical.         Problem list and histories reviewed & adjusted, as indicated.  Additional history: as documented    Patient Active Problem List   Diagnosis     Anemia     Perforated tympanic membrane     Vitamin D deficiency     Edema     Contraception, generic surveillance     Migraine     Polyarthritis     Thyroid nodule      Corns and callosities     Right foot pain     Impaired gait     Past Surgical History:   Procedure Laterality Date     NO HISTORY OF SURGERY         Social History   Substance Use Topics     Smoking status: Never Smoker     Smokeless tobacco: Never Used     Alcohol use No     Family History   Problem Relation Age of Onset     Family History Negative No family hx of      Autoimmune Disease No family hx of            Reviewed and updated as needed this visit by clinical staff  Tobacco  Allergies  Meds  Problems       Reviewed and updated as needed this visit by Provider  Allergies  Meds  Problems         ROS:  Constitutional, HEENT, cardiovascular, pulmonary, gi and gu systems are negative, except as otherwise noted.    OBJECTIVE:     /70 (BP Location: Right arm, Patient Position: Chair, Cuff Size: Adult Large)  Pulse 76  Temp 98.5  F (36.9  C) (Oral)  Wt 231 lb 9.6 oz (105.1 kg)  Breastfeeding? No  BMI 39.45 kg/m2  Body mass index is 39.45 kg/(m^2).  GENERAL: healthy, alert and no distress  MS: no gross musculoskeletal defects noted, no edema- pain with moving from sitting to standing. Pain with moving from sitting to laying. Negative straight leg raise bilaterally. No pain with palpation of the spinous processes. Pain with palpation of the left lumbar paraspinal muscles. deep tendon reflexes intact. Normal forward flexion, rotation and lateral bending.   SKIN: no suspicious lesions or rashes  NEURO: Normal strength and tone, mentation intact and speech normal  PSYCH: mentation appears normal, affect normal/bright    Diagnostic Test Results:  none     ASSESSMENT/PLAN:       ICD-10-CM    1. Acute left-sided low back pain without sciatica M54.5 naproxen (NAPROSYN) 500 MG tablet     tiZANidine (ZANAFLEX) 4 MG tablet   Suspect muscle spasm. Try naproxen and zanaflex. Advised may take up to 1 week to improve. Can try heat and/or ice as tolerated.     FUTURE APPOINTMENTS:       - Follow-up for annual  visit or as needed    Kady Childress PA-C  [facility]

## 2018-10-31 ENCOUNTER — OFFICE VISIT (OUTPATIENT)
Dept: FAMILY MEDICINE | Facility: CLINIC | Age: 38
End: 2018-10-31
Payer: COMMERCIAL

## 2018-10-31 VITALS
BODY MASS INDEX: 39.34 KG/M2 | SYSTOLIC BLOOD PRESSURE: 118 MMHG | TEMPERATURE: 98 F | DIASTOLIC BLOOD PRESSURE: 77 MMHG | OXYGEN SATURATION: 100 % | HEART RATE: 71 BPM | WEIGHT: 231 LBS

## 2018-10-31 DIAGNOSIS — R05.9 COUGH: ICD-10-CM

## 2018-10-31 DIAGNOSIS — H66.002 ACUTE SUPPURATIVE OTITIS MEDIA OF LEFT EAR WITHOUT SPONTANEOUS RUPTURE OF TYMPANIC MEMBRANE, RECURRENCE NOT SPECIFIED: Primary | ICD-10-CM

## 2018-10-31 PROCEDURE — 99213 OFFICE O/P EST LOW 20 MIN: CPT | Performed by: PHYSICIAN ASSISTANT

## 2018-10-31 RX ORDER — AZITHROMYCIN 250 MG/1
TABLET, FILM COATED ORAL
Qty: 6 TABLET | Refills: 0 | Status: SHIPPED | OUTPATIENT
Start: 2018-10-31 | End: 2019-01-10

## 2018-10-31 NOTE — MR AVS SNAPSHOT
After Visit Summary   10/31/2018    Arielle Paul    MRN: [id number]           Patient Information     Date Of Birth          1980        Visit Information        Provider Department      10/31/2018 9:15 AM Kady Childress PA-C; [employer] [facility]        Today's Diagnoses     Acute suppurative otitis media of left ear without spontaneous rupture of tympanic membrane, recurrence not specified    -  1    Cough           Follow-ups after your visit        Who to contact     If you have questions or need follow up information about today's clinic visit or your schedule please contact [facility] directly at [phone].  Normal or non-critical lab and imaging results will be communicated to you by MyChart, letter or phone within 4 business days after the clinic has received the results. If you do not hear from us within 7 days, please contact the clinic through MyChart or phone. If you have a critical or abnormal lab result, we will notify you by phone as soon as possible.  Submit refill requests through [employer] or call your pharmacy and they will forward the refill request to us. Please allow 3 business days for your refill to be completed.          Additional Information About Your Visit        Care EveryWhere ID     This is your Care EveryWhere ID. This could be used by other organizations to access your [place] medical records  [id number]        Your Vitals Were     Pulse Temperature Pulse Oximetry Breastfeeding? BMI (Body Mass Index)       71 98  F (36.7  C) (Oral) 100% No 39.34 kg/m2        Blood Pressure from Last 3 Encounters:   10/31/18 118/77   05/25/18 104/70   04/25/18 101/62    Weight from Last 3 Encounters:   10/31/18 231 lb (104.8 kg)   05/25/18 231 lb 9.6 oz (105.1 kg)   04/25/18 232 lb (105.2 kg)              Today, you had the following     No orders found for display         Today's Medication Changes           These changes are accurate as of 10/31/18  9:44 AM.  If you have any questions, ask your nurse or doctor.               Start taking these medicines.        Dose/Directions    azithromycin 250 MG tablet   Commonly known as:  ZITHROMAX   Used for:  Acute suppurative otitis media of left ear without spontaneous rupture of tympanic membrane, recurrence not specified   Started by:  Kady Childress PA-C        Two tablets first day, then one tablet daily for four days.   Quantity:  6 tablet   Refills:  0            Where to get your medicines      These medications were sent to [facility] - [place], [place] - [address]  [address], [facility] [address]     Phone:  [phone]     azithromycin 250 MG tablet                Primary Care Provider Office Phone # Fax #    Jayna Michelle [phone]       [address] [address]        Equal Access to Services     KEN AMIN : Hadii karri campos hadasho Soomaali, waaxda luqadaha, qaybta kaalmada adeegyada, anushka humphriesin hayaan nate sierra . So [facility] [phone].    ATENCIÓN: Si habla español, tiene a yang disposición servicios gratuitos de asistencia lingüística. Llame al [phone].    We comply with applicable federal civil rights laws and [place] laws. We do not discriminate on the basis of race, color, national origin, age, disability, sex, sexual orientation, or gender identity.            Thank you!     Thank you for choosing [facility]  for your care. Our goal is always to provide you with excellent care. Hearing back from our patients is one way we can continue to improve our services. Please take a few minutes to complete the written survey that you may receive in the mail after your visit with us. Thank you!             Your Updated Medication List - Protect others around you: Learn how to safely use, store and throw away your medicines at  www.disposemymeds.org.          This list is accurate as of 10/31/18  9:44 AM.  Always use your most recent med list.                   Brand Name Dispense Instructions for use Diagnosis    azithromycin 250 MG tablet    ZITHROMAX    6 tablet    Two tablets first day, then one tablet daily for four days.    Acute suppurative otitis media of left ear without spontaneous rupture of tympanic membrane, recurrence not specified       Vitamin D (Cholecalciferol) 1000 units Tabs      Take 1 tablet by mouth daily

## 2018-10-31 NOTE — PROGRESS NOTES
SUBJECTIVE:   Arielle Paul is a 38 year old female who presents to clinic today for the following health issues:      Acute Illness   Acute illness concerns: Cough  Onset: x2 weeks    Fever: no    Chills/Sweats: no    Headache (location?): no    Sinus Pressure:no    Conjunctivitis:  no    Ear Pain: YES: left    Rhinorrhea: no     Congestion: no     Sore Throat: no     Cough: YES-non-productive, barking, worsening over time    Wheeze: no    Decreased Appetite: no    Nausea: no    Vomiting: no    Diarrhea:  no    Dysuria/Freq.: no    Fatigue/Achiness: YES    Sick/Strep Exposure: no     Therapies Tried and outcome: none    Not worsening, but not improving.   Has not tried any over the counter medications.   In the past a z pack helped with similar symptoms.   No shortness of breath, or chest pain.   Cough is dry and worse at night.       Problem list and histories reviewed & adjusted, as indicated.  Additional history: as documented    Patient Active Problem List   Diagnosis     Anemia     Perforated tympanic membrane     Vitamin D deficiency     Edema     Contraception, generic surveillance     Migraine     Polyarthritis     Thyroid nodule     Corns and callosities     Right foot pain     Impaired gait     Past Surgical History:   Procedure Laterality Date     NO HISTORY OF SURGERY         Social History   Substance Use Topics     Smoking status: Never Smoker     Smokeless tobacco: Never Used     Alcohol use No     Family History   Problem Relation Age of Onset     Family History Negative No family hx of      Autoimmune Disease No family hx of            Reviewed and updated as needed this visit by clinical staff  Tobacco  Allergies  Meds  Problems  Med Hx  Surg Hx  Fam Hx  Soc Hx        Reviewed and updated as needed this visit by Provider  Allergies  Meds  Problems         ROS:  Constitutional, HEENT, cardiovascular, pulmonary, gi and gu systems are negative, except as otherwise noted.    OBJECTIVE:      /77 (BP Location: Right arm, Patient Position: Chair, Cuff Size: Adult Large)  Pulse 71  Temp 98  F (36.7  C) (Oral)  Wt 231 lb (104.8 kg)  SpO2 100%  Breastfeeding? No  BMI 39.34 kg/m2  Body mass index is 39.34 kg/(m^2).  GENERAL: healthy, alert and no distress  HENT: ear canals normal, Left TM is red and bulging, no light reflex, Right TM shiny grey with fluid noted, nose and mouth without ulcers or lesions  NECK: no adenopathy,   RESP: lungs clear to auscultation - no rales, rhonchi or wheezes  CV: regular rate and rhythm, normal S1 S2, no S3 or S4, no murmur, click or rub,     Diagnostic Test Results:  none     ASSESSMENT/PLAN:       ICD-10-CM    1. Acute suppurative otitis media of left ear without spontaneous rupture of tympanic membrane, recurrence not specified H66.002 azithromycin (ZITHROMAX) 250 MG tablet   2. Cough R05    Advised patient that she should take amoxicillin for 10 days to treat the AOM. Cough is likely triggered from PND or viral. Patient refuses to take anything but a Z pack. Discussed that this is not an optimal antibiotic for her infection. She refused to consider alternative. I felt it was better to attempt treatment than risk perforation of the TM. Patient was instructed to return to clinic if symptoms not improving.     FUTURE APPOINTMENTS:       - Follow-up for annual visit or as needed    Kady Childress PA-C  [facility]

## 2018-11-12 ENCOUNTER — HEALTH MAINTENANCE LETTER (OUTPATIENT)
Age: 38
End: 2018-11-12

## 2019-01-10 ENCOUNTER — HOSPITAL ENCOUNTER (EMERGENCY)
Facility: CLINIC | Age: 39
Discharge: HOME | End: 2019-01-10
Attending: EMERGENCY MEDICINE | Admitting: EMERGENCY MEDICINE
Payer: COMMERCIAL

## 2019-01-10 VITALS
TEMPERATURE: 99.6 F | DIASTOLIC BLOOD PRESSURE: 52 MMHG | RESPIRATION RATE: 20 BRPM | SYSTOLIC BLOOD PRESSURE: 101 MMHG | BODY MASS INDEX: 39.17 KG/M2 | OXYGEN SATURATION: 99 % | WEIGHT: 230 LBS | HEART RATE: 89 BPM

## 2019-01-10 DIAGNOSIS — L02.411 ABSCESS OF RIGHT AXILLA: ICD-10-CM

## 2019-01-10 DIAGNOSIS — L02.419 AXILLARY ABSCESS: ICD-10-CM

## 2019-01-10 PROCEDURE — 10060 I&D ABSCESS SIMPLE/SINGLE: CPT

## 2019-01-10 PROCEDURE — 25000132 ZZH RX MED GY IP 250 OP 250 PS 637: Performed by: EMERGENCY MEDICINE

## 2019-01-10 PROCEDURE — 99284 EMERGENCY DEPT VISIT MOD MDM: CPT | Mod: 25 | Performed by: EMERGENCY MEDICINE

## 2019-01-10 PROCEDURE — 99283 EMERGENCY DEPT VISIT LOW MDM: CPT | Mod: 25

## 2019-01-10 PROCEDURE — 87077 CULTURE AEROBIC IDENTIFY: CPT | Performed by: EMERGENCY MEDICINE

## 2019-01-10 PROCEDURE — 10060 I&D ABSCESS SIMPLE/SINGLE: CPT | Mod: Z6 | Performed by: EMERGENCY MEDICINE

## 2019-01-10 PROCEDURE — 87186 SC STD MICRODIL/AGAR DIL: CPT | Performed by: EMERGENCY MEDICINE

## 2019-01-10 PROCEDURE — 87070 CULTURE OTHR SPECIMN AEROBIC: CPT | Performed by: EMERGENCY MEDICINE

## 2019-01-10 PROCEDURE — 25000125 ZZHC RX 250: Performed by: EMERGENCY MEDICINE

## 2019-01-10 RX ORDER — OXYCODONE HYDROCHLORIDE 5 MG/1
5-10 TABLET ORAL EVERY 6 HOURS PRN
Qty: 15 TABLET | Refills: 0 | Status: SHIPPED | OUTPATIENT
Start: 2019-01-10 | End: 2019-01-14

## 2019-01-10 RX ORDER — LIDOCAINE/PRILOCAINE 2.5 %-2.5%
1 CREAM (GRAM) TOPICAL
Status: COMPLETED | OUTPATIENT
Start: 2019-01-10 | End: 2019-01-10

## 2019-01-10 RX ORDER — IBUPROFEN 600 MG/1
600 TABLET, FILM COATED ORAL ONCE
Status: COMPLETED | OUTPATIENT
Start: 2019-01-10 | End: 2019-01-10

## 2019-01-10 RX ORDER — CLINDAMYCIN HCL 300 MG
300 CAPSULE ORAL 4 TIMES DAILY
Qty: 40 CAPSULE | Refills: 0 | Status: SHIPPED | OUTPATIENT
Start: 2019-01-10 | End: 2019-06-24

## 2019-01-10 RX ORDER — OXYCODONE AND ACETAMINOPHEN 5; 325 MG/1; MG/1
1 TABLET ORAL ONCE
Status: COMPLETED | OUTPATIENT
Start: 2019-01-10 | End: 2019-01-10

## 2019-01-10 RX ADMIN — LIDOCAINE HYDROCHLORIDE AND EPINEPHRINE 10 ML: 20; 10 INJECTION, SOLUTION INFILTRATION; PERINEURAL at 19:10

## 2019-01-10 RX ADMIN — IBUPROFEN 600 MG: 600 TABLET ORAL at 19:13

## 2019-01-10 RX ADMIN — LIDOCAINE AND PRILOCAINE 1 G: 25; 25 CREAM TOPICAL at 18:50

## 2019-01-10 RX ADMIN — OXYCODONE HYDROCHLORIDE AND ACETAMINOPHEN 1 TABLET: 5; 325 TABLET ORAL at 20:25

## 2019-01-10 ASSESSMENT — ENCOUNTER SYMPTOMS
COLOR CHANGE: 1
COUGH: 0
DIARRHEA: 0
FEVER: 0
NAUSEA: 0
ABDOMINAL PAIN: 0
VOMITING: 0
SORE THROAT: 0
RHINORRHEA: 0

## 2019-01-10 NOTE — ED AVS SNAPSHOT
[facility], Emergency Department  [address]  [facility] [address]  Phone:  [phone]  Fax:  [phone]                                    Arielle Paul   MRN: [id number]    Department:  [facility], Emergency Department   Date of Visit:  1/10/2019           After Visit Summary Signature Page    I have received my discharge instructions, and my questions have been answered. I have discussed any challenges I see with this plan with the nurse or doctor.    ..........................................................................................................................................  Patient/Patient Representative Signature      ..........................................................................................................................................  Patient Representative Print Name and Relationship to Patient    ..................................................               ................................................  Date                                   Time    ..........................................................................................................................................  Reviewed by Signature/Title    ...................................................              ..............................................  Date                                               Time          22EPIC Rev 08/18

## 2019-01-11 ASSESSMENT — ENCOUNTER SYMPTOMS
WOUND: 1
CHILLS: 0

## 2019-01-11 NOTE — DISCHARGE INSTRUCTIONS
You have been seen in the emergency department today for an abscess in your armpit.  We have placed a packing in there.  It is important that you keep the packing in for at least 2-3 days.  You need to follow-up with your clinic to have them checked this within 2-3 days.  You can call to make an appointment.  You can take the antibiotic until it is gone.  Be aware that this antibiotic can cause diarrhea.  We have given you pain medications to take as needed, be aware that this can cause sedation.  Do not drink alcohol or drive a vehicle while taking the prescription pain medicine.  You can also use Tylenol and Motrin at home to help with pain.  Follow-up as directed.    Please make an appointment to follow up with [facility] (phone: [phone]) as soon as possible.\

## 2019-01-11 NOTE — ED PROVIDER NOTES
[facility] EMERGENCY DEPARTMENT ([facility])    1/10/19       History     Chief Complaint   Patient presents with     Abscess     abscess in right arm pit     The history is provided by the patient and medical records.     Arielle Paul is a 39 year old female with a medical history significant for hidradenitis who presents to the Emergency Department for evaluation of an abscess to her right axillary region.  The patient reports that she first noticed a small bump in her right axillary region 3 days ago; however, over the last 3 days the swelling has significantly worsened and is now associated with pain.  The patient has been taking ibuprofen for pain management which has significantly helped.  She denies any fevers.  She also denies any trauma or injury to the area of swelling.  She states that she tried a topical ointment to the area, she is not able to recall what the name of this is, but she states that it is something from [place].  She did not have improvement with this ointment.  The patient denies any drainage from the site.  She reports that she is otherwise feeling at her baseline of health, she denies any cough, runny nose, sore throat, other cold-like symptoms, nausea, vomiting, diarrhea or abdominal pain.  She reports that she is currently on her menstrual period and she denies any chance of pregnancy.  The patient used to have a PCP through [facility], but they have moved and she has not reestablished primary care.    Review of patient's chart, patient had an abscess to her right axillary region in 9/2015; at that time, she had the abscess I&D and was started on doxycycline with improvement.    I have reviewed the Medications, Allergies, Past Medical and Surgical History, and Social History in the [facility] system.    Past Medical History:   Diagnosis Date     Abnormal maternal glucose tolerance, complicating pregnancy, childbirth, or the puerperium, unspecified as to episode of care  2/17/2006    LGA ?.  Refused 3 hr. GTT, to get Diabetic teaching and to start accucheck.     Anemia      Headache(784.0)        Past Surgical History:   Procedure Laterality Date     NO HISTORY OF SURGERY         Family History   Problem Relation Age of Onset     Family History Negative No family hx of      Autoimmune Disease No family hx of        Social History     Tobacco Use     Smoking status: Never Smoker     Smokeless tobacco: Never Used   Substance Use Topics     Alcohol use: No       No current facility-administered medications for this encounter.      Current Outpatient Medications   Medication     clindamycin (CLEOCIN) 300 MG capsule     IBUPROFEN PO     oxyCODONE (ROXICODONE) 5 MG tablet      No Known Allergies      Review of Systems   Constitutional: Negative for chills and fever.   HENT: Negative for congestion, rhinorrhea and sore throat.    Respiratory: Negative for cough.    Cardiovascular: Negative for chest pain.   Gastrointestinal: Negative for abdominal pain, diarrhea, nausea and vomiting.   Skin: Positive for color change and wound.        Positive for area of swelling to right axillary region with associated pain   All other systems reviewed and are negative.      Physical Exam   BP: 107/73  Pulse: 86  Temp: 99.8  F (37.7  C)  Resp: 16  Weight: 104.3 kg (230 lb)  SpO2: 100 %      Physical Exam   Constitutional: She appears distressed.   Anxious appearing [place] female, alert, cooperative, distressed   HENT:   Head: Normocephalic.   Cardiovascular: Normal rate, regular rhythm, normal heart sounds and intact distal pulses.   No murmur heard.  Pulmonary/Chest: Effort normal and breath sounds normal. No respiratory distress.   Abdominal: Soft. Bowel sounds are normal. She exhibits no distension. There is no tenderness.   Musculoskeletal:   Right-sided axillary abscess.  There is an oval-shaped area of induration approximately 6 cm x 12 cm.  There is a central area of fluctuance.  No drainage at  present.  Very tender to gentle touch.   Nursing note and vitals reviewed.      ED Course   6:10 PM  The patient was seen and examined by Kaitlin Agarwal MD in Room ED19.        Incision + drainage  Date/Time: 1/10/2019 8:00 PM  Performed by: Kaitlin Agarwal MD  Authorized by: Kaitlin Agarwal MD     Consent:     Consent obtained:  Written    Consent given by:  Patient    Risks discussed:  Bleeding and incomplete drainage  Location:     Type:  Abscess    Size:  4 cm x 6 cm    Location: R axilla.  Pre-procedure details:     Skin preparation:  Betadine  Anesthesia (see MAR for exact dosages):     Anesthesia method:  Topical application and local infiltration    Topical anesthetic:  EMLA cream    Local anesthetic:  Lidocaine 2% WITH epi  Procedure type:     Complexity:  Complex  Procedure details:     Needle aspiration: no      Incision types:  Single straight    Incision depth:  Subcutaneous    Scalpel blade:  10    Wound management:  Probed and deloculated, irrigated with saline and extensive cleaning    Drainage:  Purulent    Drainage amount:  Copious    Wound treatment:  Drain placed    Packing materials:  1/4 in iodoform gauze  Post-procedure details:     Patient tolerance of procedure:  Tolerated with difficulty                 Critical Care time:  none             Labs Ordered and Resulted from Time of ED Arrival Up to the Time of Departure from the ED - No data to display         Assessments & Plan (with Medical Decision Making)   This is a 39-year-old female who presents to the Emergency Department today with an abscess in her right axilla.  She has a prior history of hidradenitis.  She did require I&D for a similar presentation back in 2015.  On exam, she has large area of swelling in the right axilla with overlying fluctuance.  There is surrounding induration, approximately 12 cm x 6 cm with what appears to be a pointing abscess which itself is about 4 x 6 cm.  I explained to the patient that  given the size of this, it is going to be important that we try and drain this.  I did explain the procedure to her.  She initially agreed, then decided against it.  I had an extensive conversation with her about the risks of not draining an abscess this large.  Explained that it is not likely that this would resolve on its own and it is likely that she would get worse.  Had a conversation for at least 20 minutes about this.  Ultimately she agreed.  We first applied EMLA cream for topical analgesia.  Subsequent to this we infiltrated with 2% lidocaine with epinephrine.  Afterwards, the area was incised and drained. Copious purulent return noted.  A curved hemostat was used to break up loculations.  We needed to take very frequent breaks to facilitate patient comfort.  We subsequently irrigated this.  Quarter inch iodoform gauze placed in the abscess cavity.  Given the size, I do think that antibiotics are warranted.  I have ordered a wound culture from this.  We will cover her for presumed MRSA with clindamycin. Oxycodone prescription also written for - typical precautions given. I recommend that she follow-up with a PCP in 2-3 days.  I also explained that if she has recurrent episodes of hidradenitis, she may ultimately require referral to a surgeon for definitive treatment and excision.    This part of the medical record was transcribed by Gelacio Niocle, Medical Scribe, from a dictation done by Kaitlin Agarwal MD.       I have reviewed the nursing notes.    I have reviewed the findings, diagnosis, plan and need for follow up with the patient.       Medication List      Started    clindamycin 300 MG capsule  Commonly known as:  CLEOCIN  300 mg, Oral, 4 TIMES DAILY     oxyCODONE 5 MG tablet  Commonly known as:  ROXICODONE  5-10 mg, Oral, EVERY 6 HOURS PRN            Final diagnoses:   Axillary abscess     I, Gelacio Nicole, am serving as a trained medical scribe to document services personally performed by Kaitlin  MD Stefano, based on the provider's statements to me.   I, Kaitlin Agarwal MD, was physically present and have reviewed and verified the accuracy of this note documented by Gelacio Nicole.    1/10/2019   [facility], [place], EMERGENCY DEPARTMENT     Kaitlin Agarwal MD  01/11/19 0047

## 2019-01-13 ENCOUNTER — TELEPHONE (OUTPATIENT)
Dept: EMERGENCY MEDICINE | Facility: CLINIC | Age: 39
End: 2019-01-13

## 2019-01-13 LAB
BACTERIA SPEC CULT: ABNORMAL
Lab: ABNORMAL
SPECIMEN SOURCE: ABNORMAL

## 2019-01-14 ENCOUNTER — OFFICE VISIT (OUTPATIENT)
Dept: FAMILY MEDICINE | Facility: CLINIC | Age: 39
End: 2019-01-14
Payer: COMMERCIAL

## 2019-01-14 VITALS
HEIGHT: 64 IN | DIASTOLIC BLOOD PRESSURE: 76 MMHG | TEMPERATURE: 97.5 F | BODY MASS INDEX: 38.76 KG/M2 | SYSTOLIC BLOOD PRESSURE: 109 MMHG | WEIGHT: 227 LBS | HEART RATE: 73 BPM

## 2019-01-14 DIAGNOSIS — L02.411 ABSCESS OF RIGHT AXILLA: Primary | ICD-10-CM

## 2019-01-14 PROCEDURE — 99213 OFFICE O/P EST LOW 20 MIN: CPT | Performed by: PHYSICIAN ASSISTANT

## 2019-01-14 ASSESSMENT — MIFFLIN-ST. JEOR: SCORE: 1701.16

## 2019-01-14 NOTE — TELEPHONE ENCOUNTER
Erroneous encounter.    Miiram Richardson RN  [place] [employer] [facility] RN  Lung Nodule and ED Lab Result RN  Epic pool (ED late result f/u RN): [id number]  [facility] INCIDENTAL RADIOLOGY F/U NURSES: P [id number]  # [phone]'

## 2019-01-14 NOTE — PROGRESS NOTES
"  SUBJECTIVE:   Arielle Paul is a 39 year old female who presents to clinic today for the following health issues:      ED/UC Followup:    Facility:  [facility]  Date of visit: 1/10/2019  Reason for visit: Axillary Abscess  Current Status: Pt stated that the abscess on her right axillary is getting better.       Patient was seen in the ED for an axillary abscess. A culture showed staph aureus and is susceptible to the clindamycin that she was prescribed.   No side effects from the antibiotics.   The oxycodone gave her some constipation, has stopped because there is no longer any pain.   It was packed, has not been removed.     Problem list and histories reviewed & adjusted, as indicated.  Additional history: as documented    Patient Active Problem List   Diagnosis     Anemia     Perforated tympanic membrane     Vitamin D deficiency     Edema     Contraception, generic surveillance     Migraine     Polyarthritis     Thyroid nodule     Corns and callosities     Right foot pain     Impaired gait     Past Surgical History:   Procedure Laterality Date     NO HISTORY OF SURGERY         Social History     Tobacco Use     Smoking status: Never Smoker     Smokeless tobacco: Never Used   Substance Use Topics     Alcohol use: No     Family History   Problem Relation Age of Onset     Family History Negative No family hx of      Autoimmune Disease No family hx of            Reviewed and updated as needed this visit by clinical staff  Tobacco  Allergies  Meds  Problems  Med Hx  Surg Hx  Fam Hx  Soc Hx        Reviewed and updated as needed this visit by Provider  Tobacco  Allergies  Meds  Problems  Med Hx  Surg Hx  Fam Hx         ROS:  Constitutional, HEENT, cardiovascular, pulmonary, gi and gu systems are negative, except as otherwise noted.    OBJECTIVE:     /76 (BP Location: Left arm, Patient Position: Chair, Cuff Size: Adult Large)   Pulse 73   Temp 97.5  F (36.4  C) (Oral)   Ht 1.628 m (5' 4.09\")   Wt " 103 kg (227 lb)   LMP 01/09/2019   BMI 38.85 kg/m    Body mass index is 38.85 kg/m .  GENERAL: healthy, alert and no distress  SKIN: Right axilla with firm draining abscess. Iodoform packing was removed. Purulent discharge on the packing. Elected to leave the packing out and keep covered with bandage.     Diagnostic Test Results:  none     ASSESSMENT/PLAN:       ICD-10-CM    1. Abscess of right axilla L02.411    Discussed wound care with patient. She will complete all the antibiotics. If the lesion is not resolved at the end of the antibiotics she will follow up.   Reviewed culture and bacteria is sensitive to antibiotics.     FUTURE APPOINTMENTS:       - Follow-up for annual visit or as needed    Kady Childress PA-C  [facility]

## 2019-02-05 PROBLEM — R26.9 IMPAIRED GAIT: Status: RESOLVED | Noted: 2017-05-04 | Resolved: 2019-02-05

## 2019-02-05 PROBLEM — M79.671 RIGHT FOOT PAIN: Status: RESOLVED | Noted: 2017-04-20 | Resolved: 2019-02-05

## 2019-02-05 NOTE — PROGRESS NOTES
"Subjective:  HPI                    Objective:  System    Physical Exam    General     ROS    Assessment/Plan:    DISCHARGE REPORT    Progress reporting period is from eval to 5/4/17.     SUBJECTIVE  Subjective: Pain has improved \"because of the US.\" Has not had pain every morning for the first time in a long time. Arrives in flats. Reviewed education. Pt in agreement but not sure if she will change footwear.    Current Pain level: 2/10   Initial Pain level: 8/10(at worst)   Changes in function: No changes noted in function since last SOAP note   Adverse reactions: None;   ,     Patient has failed to return to therapy so current objective findings are unknown.  The subjective and objective information are from the last SOAP note on this patient.    OBJECTIVE  Objective: Arrives with shuffling gait pattern. Fatigues quickly with exercises      ASSESSMENT/PLAN  STG/LTGs have been met or progress has been made towards goals:  Yes (See Goal flow sheet completed today.)  Assessment of Progress: The patient has not returned to therapy. Current status is unknown.  Self Management Plans:  Patient has been instructed in a home treatment program.  Arielle continues to require the following intervention to meet STG and LTG's: PT intervention is no longer required to meet STG/LTG.  The patient failed to complete scheduled/ordered appointments so current information is unknown.  We will discharge this patient from PT.    Recommendations:  This patient is ready to be discharged from therapy and continue their home treatment program.    Please refer to the daily flowsheet for treatment today, total treatment time and time spent performing 1:1 timed codes.    "

## 2019-05-29 ENCOUNTER — HOSPITAL ENCOUNTER (EMERGENCY)
Facility: CLINIC | Age: 39
Discharge: HOME | End: 2019-05-30
Attending: EMERGENCY MEDICINE | Admitting: EMERGENCY MEDICINE
Payer: COMMERCIAL

## 2019-05-29 DIAGNOSIS — H00.011 HORDEOLUM EXTERNUM OF RIGHT UPPER EYELID: ICD-10-CM

## 2019-05-29 DIAGNOSIS — H01.001 BLEPHARITIS OF RIGHT UPPER EYELID, UNSPECIFIED TYPE: ICD-10-CM

## 2019-05-29 PROCEDURE — 99283 EMERGENCY DEPT VISIT LOW MDM: CPT | Performed by: EMERGENCY MEDICINE

## 2019-05-29 PROCEDURE — 99284 EMERGENCY DEPT VISIT MOD MDM: CPT | Mod: Z6 | Performed by: EMERGENCY MEDICINE

## 2019-05-29 NOTE — ED AVS SNAPSHOT
[facility], Emergency Department  [address]  [facility] [address]  Phone:  [phone]  Fax:  [phone]                                    Arielle Paul   MRN: [id number]    Department:  [facility], Emergency Department   Date of Visit:  5/29/2019           After Visit Summary Signature Page    I have received my discharge instructions, and my questions have been answered. I have discussed any challenges I see with this plan with the nurse or doctor.    ..........................................................................................................................................  Patient/Patient Representative Signature      ..........................................................................................................................................  Patient Representative Print Name and Relationship to Patient    ..................................................               ................................................  Date                                   Time    ..........................................................................................................................................  Reviewed by Signature/Title    ...................................................              ..............................................  Date                                               Time          22EPIC Rev 08/18

## 2019-05-30 VITALS
DIASTOLIC BLOOD PRESSURE: 72 MMHG | WEIGHT: 230.8 LBS | RESPIRATION RATE: 18 BRPM | OXYGEN SATURATION: 100 % | HEART RATE: 80 BPM | BODY MASS INDEX: 39.5 KG/M2 | TEMPERATURE: 98.1 F | SYSTOLIC BLOOD PRESSURE: 136 MMHG

## 2019-05-30 PROCEDURE — 25000132 ZZH RX MED GY IP 250 OP 250 PS 637: Performed by: EMERGENCY MEDICINE

## 2019-05-30 RX ORDER — SULFAMETHOXAZOLE/TRIMETHOPRIM 800-160 MG
1 TABLET ORAL ONCE
Status: COMPLETED | OUTPATIENT
Start: 2019-05-30 | End: 2019-05-30

## 2019-05-30 RX ORDER — PROPARACAINE HYDROCHLORIDE 5 MG/ML
1 SOLUTION/ DROPS OPHTHALMIC ONCE
Status: DISCONTINUED | OUTPATIENT
Start: 2019-05-30 | End: 2019-05-30 | Stop reason: HOSPADM

## 2019-05-30 RX ORDER — SULFAMETHOXAZOLE/TRIMETHOPRIM 800-160 MG
1 TABLET ORAL 2 TIMES DAILY
Qty: 14 TABLET | Refills: 0 | Status: SHIPPED | OUTPATIENT
Start: 2019-05-30 | End: 2019-06-24

## 2019-05-30 RX ORDER — POLYMYXIN B SULFATE AND TRIMETHOPRIM 1; 10000 MG/ML; [USP'U]/ML
1 SOLUTION OPHTHALMIC EVERY 4 HOURS
Qty: 10 ML | Refills: 0 | Status: SHIPPED | OUTPATIENT
Start: 2019-05-30 | End: 2019-06-24

## 2019-05-30 RX ADMIN — SULFAMETHOXAZOLE AND TRIMETHOPRIM 1 TABLET: 800; 160 TABLET ORAL at 00:52

## 2019-05-30 RX ADMIN — AMOXICILLIN AND CLAVULANATE POTASSIUM 1 TABLET: 875; 125 TABLET, FILM COATED ORAL at 00:52

## 2019-05-30 NOTE — DISCHARGE INSTRUCTIONS
Warm moist compresses several times daily to affected area.  Frequently wipe away the drainage fluid from eye with warm wet cloth  Use eye drops as directed. Take antibiotics as directed.  Clinic follow up this week.  Return if persistent symptoms.

## 2019-05-30 NOTE — ED PROVIDER NOTES
"    [facility] [place] ([facility])    5/29/19     ED 7 12:13 AM   History     Chief Complaint   Patient presents with     Eye Pain     Patient presents to ED with c/o R eye swelling, pain, and drainage. Sxs onset yesterday morning. No vision disturbance.      HPI  Arielle Paul is a 39 year old female who presents with right upper eyelid swelling, right eye watering  that started yesterday. Her right eye is itchy and painful with this. Her right eye drainage is watery. She has never had this problem before. She does not wear glasses or contact lenses.  Her right upper eyelid developed a \"pimple.\"no vision changes. No trauma. No photophobia.  No pain with movement of extraocular muscles. No eye/ocular pain.  I have reviewed the Medications, Allergies, Past Medical and Surgical History, and Social History in the [employer] system.  Past Medical History:   Diagnosis Date     Abnormal maternal glucose tolerance, complicating pregnancy, childbirth, or the puerperium, unspecified as to episode of care 2/17/2006    LGA ?.  Refused 3 hr. GTT, to get Diabetic teaching and to start accucheck.     Anemia      Headache(784.0)        Review of Systems   Constitutional: Negative for chills, diaphoresis, fatigue and fever.   HENT: Negative for congestion, ear pain, facial swelling, mouth sores, rhinorrhea, sinus pressure, sinus pain and sore throat.    Eyes: Positive for discharge. Negative for photophobia, pain, redness, itching and visual disturbance.        Upper right eyelid swelling.   Respiratory: Negative for cough, chest tightness and shortness of breath.    Cardiovascular: Negative for chest pain and palpitations.   Gastrointestinal: Negative for abdominal pain, nausea and vomiting.   Genitourinary: Negative for difficulty urinating.   Musculoskeletal: Negative for arthralgias, myalgias, neck pain and neck stiffness.   Skin: Negative for color change.   Allergic/Immunologic: Negative for immunocompromised " state.   Neurological: Negative for dizziness, syncope, weakness, light-headedness and headaches.   Hematological: Does not bruise/bleed easily.   Psychiatric/Behavioral: Negative for confusion.   All other systems reviewed and are negative.      Physical Exam   BP: 133/76  Pulse: 80  Heart Rate: 81  Temp: 98.5  F (36.9  C)  Resp: 16  Weight: 104.7 kg (230 lb 12.8 oz)  SpO2: 100 %      Physical Exam   Constitutional: She is oriented to person, place, and time. She appears well-developed and well-nourished. No distress.   HENT:   Head: Normocephalic and atraumatic.   Right Ear: Tympanic membrane, external ear and ear canal normal.   Left Ear: Tympanic membrane, external ear and ear canal normal.   Nose: Nose normal.   Mouth/Throat: Uvula is midline, oropharynx is clear and moist and mucous membranes are normal.   Eyes: Pupils are equal, round, and reactive to light. Conjunctivae and EOM are normal. Right eye exhibits hordeolum. Right eye exhibits no chemosis, no discharge and no exudate. No foreign body present in the right eye. Left eye exhibits no chemosis, no discharge, no exudate and no hordeolum. No foreign body present in the left eye.   Some erythema of upper right eyelid.    Neck: Normal range of motion. Neck supple.   Cardiovascular: Normal rate, regular rhythm, normal heart sounds and intact distal pulses.   Pulmonary/Chest: Effort normal and breath sounds normal. No respiratory distress. She exhibits no tenderness.   Abdominal: Soft. Bowel sounds are normal. There is no tenderness.   Musculoskeletal: Normal range of motion. She exhibits no tenderness.        Cervical back: She exhibits no tenderness.        Thoracic back: She exhibits no tenderness.        Lumbar back: She exhibits no tenderness.   Lymphadenopathy:     She has no cervical adenopathy.   Neurological: She is alert and oriented to person, place, and time. No cranial nerve deficit.   Skin: Skin is warm and dry. No abrasion and no laceration  noted. She is not diaphoretic.   Psychiatric: She has a normal mood and affect. Her behavior is normal.   Vitals reviewed.      ED Course        Procedures             Critical Care time:  none             Labs Ordered and Resulted from Time of ED Arrival Up to the Time of Departure from the ED - No data to display         Assessments & Plan (with Medical Decision Making)   Stye. May be early mild associated blepharitis. Doubt periorbital cellulitis or orbital cellulitis. Will give antibiotic eye drops and oral antibiotics and have close follow up to ensure resolution. No evidence of ocular involvement.    I have reviewed the nursing notes.    I have reviewed the findings, diagnosis, plan and need for follow up with the patient.       Medication List      Started    trimethoprim-polymyxin b 07045-6.1 UNIT/ML-% ophthalmic solution  Commonly known as:  POLYTRIM  1 drop, Right Eye, EVERY 4 HOURS        ASK your doctor about these medications    amoxicillin-clavulanate 875-125 MG tablet  Commonly known as:  AUGMENTIN  1 tablet, Oral, 2 TIMES DAILY  Ask about: Should I take this medication?     sulfamethoxazole-trimethoprim 800-160 MG tablet  Commonly known as:  BACTRIM DS  1 tablet, Oral, 2 TIMES DAILY  Ask about: Should I take this medication?            Final diagnoses:   Hordeolum externum of right upper eyelid   Blepharitis of right upper eyelid, unspecified type   I, Beverly Aggarwal, am serving as a trained medical scribe to document services personally performed by Alfa Yeboah MD based on the provider's statements to me on May 30, 2019.  This document has been checked and approved by the attending provider.    I, Alfa Yeboah MD, was physically present and have reviewed and verified the accuracy of this note documented by Beverly Aggarwal medical scribe.       5/29/2019   [facility], EMERGENCY DEPARTMENT     Alfa Yeboah MD  06/10/19 0857

## 2019-06-10 ASSESSMENT — ENCOUNTER SYMPTOMS
PHOTOPHOBIA: 0
CHEST TIGHTNESS: 0
MYALGIAS: 0
FEVER: 0
DIAPHORESIS: 0
DIFFICULTY URINATING: 0
VOMITING: 0
ARTHRALGIAS: 0
SORE THROAT: 0
CONFUSION: 0
NECK STIFFNESS: 0
NAUSEA: 0
FACIAL SWELLING: 0
PALPITATIONS: 0
SHORTNESS OF BREATH: 0
EYE ITCHING: 0
EYE PAIN: 0
DIZZINESS: 0
CHILLS: 0
EYE DISCHARGE: 1
SINUS PAIN: 0
RHINORRHEA: 0
WEAKNESS: 0
FATIGUE: 0
ABDOMINAL PAIN: 0
HEADACHES: 0
NECK PAIN: 0
COUGH: 0
BRUISES/BLEEDS EASILY: 0
EYE REDNESS: 0
SINUS PRESSURE: 0
COLOR CHANGE: 0
LIGHT-HEADEDNESS: 0

## 2019-06-24 ENCOUNTER — OFFICE VISIT (OUTPATIENT)
Dept: FAMILY MEDICINE | Facility: CLINIC | Age: 39
End: 2019-06-24
Payer: COMMERCIAL

## 2019-06-24 VITALS
HEART RATE: 58 BPM | SYSTOLIC BLOOD PRESSURE: 114 MMHG | OXYGEN SATURATION: 100 % | BODY MASS INDEX: 39.36 KG/M2 | WEIGHT: 230 LBS | TEMPERATURE: 97 F | DIASTOLIC BLOOD PRESSURE: 75 MMHG

## 2019-06-24 DIAGNOSIS — E55.9 VITAMIN D DEFICIENCY: ICD-10-CM

## 2019-06-24 DIAGNOSIS — D50.9 IRON DEFICIENCY ANEMIA, UNSPECIFIED IRON DEFICIENCY ANEMIA TYPE: ICD-10-CM

## 2019-06-24 DIAGNOSIS — H00.013 HORDEOLUM EXTERNUM OF RIGHT EYE, UNSPECIFIED EYELID: Primary | ICD-10-CM

## 2019-06-24 LAB
BASOPHILS # BLD AUTO: 0 10E9/L (ref 0–0.2)
BASOPHILS NFR BLD AUTO: 1 %
DIFFERENTIAL METHOD BLD: ABNORMAL
EOSINOPHIL # BLD AUTO: 0.1 10E9/L (ref 0–0.7)
EOSINOPHIL NFR BLD AUTO: 3 %
ERYTHROCYTE [DISTWIDTH] IN BLOOD BY AUTOMATED COUNT: 13.5 % (ref 10–15)
FERRITIN SERPL-MCNC: 6 NG/ML (ref 12–150)
HCT VFR BLD AUTO: 34.3 % (ref 35–47)
HGB BLD-MCNC: 11 G/DL (ref 11.7–15.7)
LYMPHOCYTES # BLD AUTO: 2 10E9/L (ref 0.8–5.3)
LYMPHOCYTES NFR BLD AUTO: 50 %
MCH RBC QN AUTO: 27.4 PG (ref 26.5–33)
MCHC RBC AUTO-ENTMCNC: 32.1 G/DL (ref 31.5–36.5)
MCV RBC AUTO: 85 FL (ref 78–100)
MONOCYTES # BLD AUTO: 0.4 10E9/L (ref 0–1.3)
MONOCYTES NFR BLD AUTO: 11 %
NEUTROPHILS # BLD AUTO: 1.3 10E9/L (ref 1.6–8.3)
NEUTROPHILS NFR BLD AUTO: 35 %
PLATELET # BLD AUTO: 246 10E9/L (ref 150–450)
PLATELET # BLD EST: ABNORMAL 10*3/UL
RBC # BLD AUTO: 4.02 10E12/L (ref 3.8–5.2)
RBC MORPH BLD: NORMAL
WBC # BLD AUTO: 3.8 10E9/L (ref 4–11)

## 2019-06-24 PROCEDURE — 85025 COMPLETE CBC W/AUTO DIFF WBC: CPT | Performed by: FAMILY MEDICINE

## 2019-06-24 PROCEDURE — 82728 ASSAY OF FERRITIN: CPT | Performed by: FAMILY MEDICINE

## 2019-06-24 PROCEDURE — 82306 VITAMIN D 25 HYDROXY: CPT | Performed by: FAMILY MEDICINE

## 2019-06-24 PROCEDURE — 99213 OFFICE O/P EST LOW 20 MIN: CPT | Performed by: FAMILY MEDICINE

## 2019-06-24 PROCEDURE — 36415 COLL VENOUS BLD VENIPUNCTURE: CPT | Performed by: FAMILY MEDICINE

## 2019-06-24 RX ORDER — POLYMYXIN B SULFATE AND TRIMETHOPRIM 1; 10000 MG/ML; [USP'U]/ML
1 SOLUTION OPHTHALMIC EVERY 4 HOURS
Qty: 10 ML | Refills: 0 | Status: SHIPPED | OUTPATIENT
Start: 2019-06-24 | End: 2020-04-17

## 2019-06-24 RX ORDER — CEPHALEXIN 500 MG/1
500 CAPSULE ORAL 3 TIMES DAILY
Qty: 21 CAPSULE | Refills: 0 | Status: SHIPPED | OUTPATIENT
Start: 2019-06-24 | End: 2019-09-25

## 2019-06-24 NOTE — PROGRESS NOTES
Subjective     Arielle Paul is a 39 year old female who presents to clinic today for the following health issues:    HPI     Right eyelid swelling and painful  Patient had the same thing on the outside of the eye two weeks ago   This started 3 days ago   No sinus pain   No runny nose     Patient having periods for 5 days   They are somewhat heavy for 3 days   History of anemia and getting tired and headaches   History of low vitamin d     History of sarcoidosis which states was clear now.       O: /75 (BP Location: Right arm, Patient Position: Sitting, Cuff Size: Adult Large)   Pulse 58   Temp 97  F (36.1  C) (Oral)   Wt 104.3 kg (230 lb)   LMP 05/29/2019 (Approximate)   SpO2 100%   Breastfeeding? No   BMI 39.36 kg/m      Patient has a small swelling on the inner canthus that is tender     No discharge   Slight redness of the sclera       ICD-10-CM    1. Hordeolum externum of right eye, unspecified eyelid H00.013 cephALEXin (KEFLEX) 500 MG capsule     trimethoprim-polymyxin b (POLYTRIM) 76560-6.1 UNIT/ML-% ophthalmic solution   2. Vitamin D deficiency E55.9 Vitamin D Deficiency   3. Iron deficiency anemia, unspecified iron deficiency anemia type D50.9 CBC with platelets differential     Ferritin             P:  Cephalexin due to location   Wash eyelids with nohemy baby shampoo     She has had a history of having vitamin D deficiency in the past is also had low ferritin and associated anemia.  She complains of feeling fatigue and this is the way she felt when she had a low iron and vitamin D deficiency in the past.  Labs will be rechecked and meds will be ordered as needed.

## 2019-06-24 NOTE — LETTER
[facility]   [address] [place]  [address]  [phone]                                   June 26, 2019    Arielle Paul  [address]   [facility] [address]        Dear Arielle,    Your hemoglobin is low  Your ferritin is low and your vitamin d level is low.    You should get back on your ferrous sulfate iron supplement and you should restart your vitamin d supplement also.    Recheck labs in 3 months    Results for orders placed or performed in visit on 06/24/19   Vitamin D Deficiency   Result Value Ref Range    Vitamin D Deficiency screening 15 (L) 20 - 75 ug/L   CBC with platelets differential   Result Value Ref Range    WBC 3.8 (L) 4.0 - 11.0 10e9/L    RBC Count 4.02 3.8 - 5.2 10e12/L    Hemoglobin 11.0 (L) 11.7 - 15.7 g/dL    Hematocrit 34.3 (L) 35.0 - 47.0 %    MCV 85 78 - 100 fl    MCH 27.4 26.5 - 33.0 pg    MCHC 32.1 31.5 - 36.5 g/dL    RDW 13.5 10.0 - 15.0 %    Platelet Count 246 150 - 450 10e9/L    % Neutrophils 35.0 %    % Lymphocytes 50.0 %    % Monocytes 11.0 %    % Eosinophils 3.0 %    % Basophils 1.0 %    Absolute Neutrophil 1.3 (L) 1.6 - 8.3 10e9/L    Absolute Lymphocytes 2.0 0.8 - 5.3 10e9/L    Absolute Monocytes 0.4 0.0 - 1.3 10e9/L    Absolute Eosinophils 0.1 0.0 - 0.7 10e9/L    Absolute Basophils 0.0 0.0 - 0.2 10e9/L    RBC Morphology Normal     Platelet Estimate       Automated count confirmed.  Platelet morphology is normal.    Diff Method Automated Method    Ferritin   Result Value Ref Range    Ferritin 6 (L) 12 - 150 ng/mL       If you have any questions please call the clinic at [phone]    Sincerely,    Zaheer Sandra MD    bmd

## 2019-06-25 LAB — DEPRECATED CALCIDIOL+CALCIFEROL SERPL-MC: 15 UG/L (ref 20–75)

## 2019-06-25 NOTE — RESULT ENCOUNTER NOTE
Your hemoglobin is low  Your ferritin is low and your vitamin d level is low.    You should get back on your ferrous sulfate iron supplement and you should restart your vitamin d supplement also.    Recheck labs in 3 months

## 2019-07-11 ENCOUNTER — TELEPHONE (OUTPATIENT)
Dept: FAMILY MEDICINE | Facility: CLINIC | Age: 39
End: 2019-07-11

## 2019-07-11 DIAGNOSIS — D50.9 IRON DEFICIENCY ANEMIA, UNSPECIFIED IRON DEFICIENCY ANEMIA TYPE: ICD-10-CM

## 2019-07-11 DIAGNOSIS — E55.9 VITAMIN D DEFICIENCY: Primary | ICD-10-CM

## 2019-07-11 RX ORDER — FERROUS SULFATE 325(65) MG
325 TABLET, DELAYED RELEASE (ENTERIC COATED) ORAL DAILY
Qty: 60 TABLET | Refills: 0 | Status: SHIPPED | OUTPATIENT
Start: 2019-07-11 | End: 2019-09-25

## 2019-07-11 RX ORDER — CHOLECALCIFEROL (VITAMIN D3) 1250 MCG
50000 CAPSULE ORAL
Qty: 8 CAPSULE | Refills: 1 | Status: SHIPPED | OUTPATIENT
Start: 2019-07-11 | End: 2019-08-12

## 2019-07-11 NOTE — TELEPHONE ENCOUNTER
Attempt # 2  Called patient at home [phone] with assist of [employer]   Was call answered?  Yes, a child answered and stated mother went to get something.    Yoana Tavares RN  [facility]

## 2019-07-11 NOTE — TELEPHONE ENCOUNTER
I have ordered the iron and vitamin d   He should have recheck in 8 weeks for an appointment with provider

## 2019-07-11 NOTE — TELEPHONE ENCOUNTER
Patient returned call - left voice mail to call back.      Yoana Tavares RN  [facility]       Johana Rodriguez  (RN)  2017 19:22:33

## 2019-07-11 NOTE — TELEPHONE ENCOUNTER
Attempt # 1  Called patient at home [phone]   Was call answered?  No answer, left message to call nurse line at [phone]    Yoana Tavares RN  [facility]

## 2019-07-11 NOTE — TELEPHONE ENCOUNTER
Reason for Call:  Other prescription    Detailed comments: patient requesting prescriptions for Vitamin D and iron supplements.    Per lab results letter 6/26/2019.      Patient would like prescriptions sent to  [facility] pharmacy.  Patient requesting return call when process complete    Phone Number Patient can be reached at: Home number on file [phone] (home)    Best Time: any    Can we leave a detailed message on this number? YES    Call taken on 7/11/2019 at 10:15 AM by Bethany Tafoya

## 2019-07-11 NOTE — TELEPHONE ENCOUNTER
Notes recorded by Zaheer Sandra MD on 6/25/2019 at 5:23 PM CDT  Your hemoglobin is low  Your ferritin is low and your vitamin d level is low.    You should get back on your ferrous sulfate iron supplement and you should restart your vitamin d supplement also.    Recheck labs in 3 months      Routed to PCP to advise.    Trudi Sanchez RN [facility] Triage.

## 2019-07-12 NOTE — TELEPHONE ENCOUNTER
Notified patient of the message below per provider.    Patient stated understanding and agreeable with the plan of care. SHADIA PaulN,RN, [facility] Triage.

## 2019-07-12 NOTE — TELEPHONE ENCOUNTER
Patient returned call to clinic - left voice mail      Yoana Tavares RN  [facility]

## 2019-08-10 ENCOUNTER — TELEPHONE (OUTPATIENT)
Dept: NURSING | Facility: CLINIC | Age: 39
End: 2019-08-10

## 2019-08-10 NOTE — TELEPHONE ENCOUNTER
"\"I just picked up my refill for Vitamin D3 and I have lost it, I can't find it anywhere. Can you call in another one to [facility]?\" Called with verbal   cholecalciferol (VITAMIN D3) 25639 units (1250 mcg) capsule 8 capsule 1 7/11/2019 8/30/2019 --   Sig - Route: Take 1 capsule (50,000 Units) by mouth every 7 days for 8 doses - Oral   Zero refills.  Kayla Keller RN [facility]      "

## 2019-08-12 ENCOUNTER — TELEPHONE (OUTPATIENT)
Dept: FAMILY MEDICINE | Facility: CLINIC | Age: 39
End: 2019-08-12

## 2019-08-12 DIAGNOSIS — E55.9 VITAMIN D DEFICIENCY: ICD-10-CM

## 2019-08-12 RX ORDER — CHOLECALCIFEROL (VITAMIN D3) 1250 MCG
50000 CAPSULE ORAL
Qty: 8 CAPSULE | Refills: 0 | Status: SHIPPED | OUTPATIENT
Start: 2019-08-12 | End: 2020-04-17

## 2019-08-12 NOTE — TELEPHONE ENCOUNTER
Reason for Call:  Other     Detailed comments: Patient said that she had misplaced her Vitamin D script. [facility] pharmacy     Phone Number Patient can be reached at: Home number on file [phone] (home)    Best Time:     Can we leave a detailed message on this number? Not Applicable    Call taken on 8/12/2019 at 9:52 AM by Lucinda Rodríguez

## 2019-08-12 NOTE — TELEPHONE ENCOUNTER
Notified patient of the message below per provider.    Patient stated understanding and agreeable with the plan of care. SHADIA PaulN,RN, [facility] Triage.

## 2019-08-12 NOTE — TELEPHONE ENCOUNTER
Patient stated that she lost her pills for Vitamin D tabs and needs them resent due to insurance.    Trudi Sanchez RN [facility] Triage.

## 2019-09-25 ENCOUNTER — OFFICE VISIT (OUTPATIENT)
Dept: FAMILY MEDICINE | Facility: CLINIC | Age: 39
End: 2019-09-25
Payer: COMMERCIAL

## 2019-09-25 VITALS
HEART RATE: 74 BPM | WEIGHT: 231.6 LBS | BODY MASS INDEX: 39.54 KG/M2 | SYSTOLIC BLOOD PRESSURE: 114 MMHG | TEMPERATURE: 98.1 F | DIASTOLIC BLOOD PRESSURE: 64 MMHG | HEIGHT: 64 IN

## 2019-09-25 DIAGNOSIS — L02.412 ABSCESSES OF BOTH AXILLAE: Primary | ICD-10-CM

## 2019-09-25 DIAGNOSIS — L02.411 ABSCESSES OF BOTH AXILLAE: Primary | ICD-10-CM

## 2019-09-25 PROCEDURE — 99213 OFFICE O/P EST LOW 20 MIN: CPT | Performed by: PHYSICIAN ASSISTANT

## 2019-09-25 RX ORDER — SULFAMETHOXAZOLE/TRIMETHOPRIM 800-160 MG
1 TABLET ORAL 2 TIMES DAILY
Qty: 14 TABLET | Refills: 0 | Status: SHIPPED | OUTPATIENT
Start: 2019-09-25 | End: 2019-09-26

## 2019-09-25 ASSESSMENT — MIFFLIN-ST. JEOR: SCORE: 1721.96

## 2019-09-25 NOTE — PROGRESS NOTES
"Subjective     Arielle Paul is a 39 year old female who presents to clinic today for the following health issues:    HPI   Rash  Onset: x2 days    Description:   Location:  Both under arms  Character: painful, red  Itching (Pruritis): no     Progression of Symptoms:  worsening    Accompanying Signs & Symptoms:  Fever: no   Body aches or joint pain: no   Sore throat symptoms: no   Recent cold symptoms: no     History:   Previous similar rash: YES    Precipitating factors:   Exposure to similar rash: YES  New exposures: None   Recent travel: no     Alleviating factors:      Therapies Tried and outcome: none      Was last seen for similar 1/2019. Had an I& D in the ED. Staph Aureus was cultured.   On the right arm there is 1 arm.   On the left arm there are 5 bumps.   Hasn't done anything with them. Just red, no pus or drainage at this time. Doesn't shave, uses the hair removal cream.     Not taking anything for the pain.     Denies possibility of pregnancy.     Reviewed and updated as needed this visit by Provider         Review of Systems   ROS COMP: Constitutional, HEENT, cardiovascular, pulmonary, gi and gu systems are negative, except as otherwise noted.      Objective    /64 (BP Location: Right arm, Patient Position: Chair, Cuff Size: Adult Large)   Pulse 74   Temp 98.1  F (36.7  C) (Oral)   Ht 1.628 m (5' 4.09\")   Wt 105.1 kg (231 lb 9.6 oz)   LMP 08/24/2019   Breastfeeding? No   BMI 39.64 kg/m    Body mass index is 39.64 kg/m .  Physical Exam   GENERAL: healthy, alert and no distress  SKIN: right axilla with small deep palpable mass-early abscess vs lymph node. On the left axilla there is one small papule with a white head, Not open. There are 2 larger abscesses that are minimally red, not fluctuant but tender to palpation in the left axilla.     Diagnostic Test Results:  none         Assessment & Plan       ICD-10-CM    1. Abscesses of both axillae L02.411 sulfamethoxazole-trimethoprim " (BACTRIM DS/SEPTRA DS) 800-160 MG tablet    L02.412    Not amenable to I&D at this time. Encouraged hot packs TID and then bactrim for 7 days. return to clinic if not improving or if abscess are worsening.      No follow-ups on file.    Kady Childress PA-C  [facility]

## 2019-09-25 NOTE — PATIENT INSTRUCTIONS
Warm compress 10 minutes each armpit area 3 times per day.     Finish all the antibiotics.     tylenol 1000mg three a day as needed for pain.   Ibuprofen 600mg every 6-8 hours as needed for pain-take with food.

## 2019-09-26 ENCOUNTER — TELEPHONE (OUTPATIENT)
Dept: FAMILY MEDICINE | Facility: CLINIC | Age: 39
End: 2019-09-26

## 2019-09-26 DIAGNOSIS — L02.411 ABSCESSES OF BOTH AXILLAE: Primary | ICD-10-CM

## 2019-09-26 DIAGNOSIS — L02.412 ABSCESSES OF BOTH AXILLAE: Primary | ICD-10-CM

## 2019-09-26 RX ORDER — CLINDAMYCIN HCL 300 MG
300 CAPSULE ORAL 3 TIMES DAILY
Qty: 21 CAPSULE | Refills: 0 | Status: SHIPPED | OUTPATIENT
Start: 2019-09-26 | End: 2019-11-13

## 2019-09-26 NOTE — TELEPHONE ENCOUNTER
Attempt # 1  Called patient at home number.  Was call answered?  Yes, started Bactrim yesterday two hours later rash started. Stomach, neck. Denies dyspnea, no mouth/tongue swelling. Stopped taking the Bactrim.     Patient requesting a new antibiotic    Nurse added Bactrim to allergy list - gives a warning for the eye drops also: trimethoprim-polymyxin B?    Pharmacy cued.    Routing to PCP      Yoana Tavares RN  [facility]

## 2019-09-26 NOTE — TELEPHONE ENCOUNTER
Reason for Call:  Other call back    Detailed comments:  Patient saw you yesterday and was given sulfamethoxazole-trimethoprim (BACTRIM DS/SEPTRA DS) 800-160 MG tablet she think allergic reaction red spots like pimps she said and would like different medication.    Phone Number Patient can be reached at: Home number on file [phone] (home)    Best Time:  Anytime     Can we leave a detailed message on this number? YES    Call taken on 9/26/2019 at 8:43 AM by Adriana Harrison

## 2019-09-26 NOTE — TELEPHONE ENCOUNTER
I have sent an prescription for clindamycin. This is the same antibiotics she took earlier in the year so should tolerate it well.   Kady Childress PA-C

## 2019-09-27 ENCOUNTER — HOSPITAL ENCOUNTER (EMERGENCY)
Facility: CLINIC | Age: 39
Discharge: HOME | End: 2019-09-27
Attending: EMERGENCY MEDICINE | Admitting: EMERGENCY MEDICINE
Payer: COMMERCIAL

## 2019-09-27 VITALS
HEART RATE: 67 BPM | DIASTOLIC BLOOD PRESSURE: 76 MMHG | TEMPERATURE: 98.5 F | RESPIRATION RATE: 16 BRPM | BODY MASS INDEX: 39.8 KG/M2 | SYSTOLIC BLOOD PRESSURE: 128 MMHG | OXYGEN SATURATION: 100 % | WEIGHT: 232.5 LBS

## 2019-09-27 DIAGNOSIS — T50.905A ADVERSE EFFECT OF DRUG, INITIAL ENCOUNTER: ICD-10-CM

## 2019-09-27 PROCEDURE — 99283 EMERGENCY DEPT VISIT LOW MDM: CPT | Performed by: EMERGENCY MEDICINE

## 2019-09-27 PROCEDURE — 99283 EMERGENCY DEPT VISIT LOW MDM: CPT | Mod: Z6 | Performed by: EMERGENCY MEDICINE

## 2019-09-27 PROCEDURE — 25000132 ZZH RX MED GY IP 250 OP 250 PS 637: Performed by: EMERGENCY MEDICINE

## 2019-09-27 RX ORDER — DIPHENHYDRAMINE HCL 25 MG
25 TABLET ORAL EVERY 6 HOURS PRN
Qty: 30 TABLET | Refills: 0 | Status: SHIPPED | OUTPATIENT
Start: 2019-09-27 | End: 2020-04-17

## 2019-09-27 RX ORDER — METHYLPREDNISOLONE 4 MG
TABLET, DOSE PACK ORAL
Qty: 21 TABLET | Refills: 0 | Status: SHIPPED | OUTPATIENT
Start: 2019-09-27 | End: 2019-11-13

## 2019-09-27 RX ORDER — DIPHENHYDRAMINE HCL 50 MG
50 CAPSULE ORAL ONCE
Status: COMPLETED | OUTPATIENT
Start: 2019-09-27 | End: 2019-09-27

## 2019-09-27 RX ADMIN — DIPHENHYDRAMINE HYDROCHLORIDE 50 MG: 50 CAPSULE ORAL at 22:20

## 2019-09-27 ASSESSMENT — ENCOUNTER SYMPTOMS
ABDOMINAL PAIN: 0
VOMITING: 0
SHORTNESS OF BREATH: 0

## 2019-09-27 NOTE — ED AVS SNAPSHOT
[facility], Emergency Department  [address]  [facility] [address]  Phone:  [phone]  Fax:  [phone]                                    Arielle Paul   MRN: [id number]    Department:  [facility], Emergency Department   Date of Visit:  9/27/2019           After Visit Summary Signature Page    I have received my discharge instructions, and my questions have been answered. I have discussed any challenges I see with this plan with the nurse or doctor.    ..........................................................................................................................................  Patient/Patient Representative Signature      ..........................................................................................................................................  Patient Representative Print Name and Relationship to Patient    ..................................................               ................................................  Date                                   Time    ..........................................................................................................................................  Reviewed by Signature/Title    ...................................................              ..............................................  Date                                               Time          22EPIC Rev 08/18

## 2019-09-28 ASSESSMENT — ENCOUNTER SYMPTOMS
COUGH: 0
CHILLS: 0
WHEEZING: 0
FEVER: 0
TROUBLE SWALLOWING: 0
DYSURIA: 0

## 2019-09-28 NOTE — DISCHARGE INSTRUCTIONS
You have been seen in the ER today for a medication reaction causing itching and a rash.  You should avoid bactrim (the antibiotic you were taking at first) and this has been placed on your allergy list.      You can take the benadryl to help with itching - this can cause sedation/sleepiness so be aware of this and do not drive a vehicle while taking this.  You have also been prescribed a medrol dosepak which is a steroid pack to help with the swelling and inflammation.  Follow the instructions on the blister pack until it is gone.      Take the new antibiotic (clindamycin) that your clinic gave you today.     Come back to the emergency department if you notice significant problems with swelling of the lips, tongue, or throat, or difficulty breathing.

## 2019-09-28 NOTE — ED PROVIDER NOTES
[facility] EMERGENCY DEPARTMENT ([facility])     September 27, 2019    History     Chief Complaint   Patient presents with     Allergic Reaction     itching and rash around neck after taking Bactrim DS couple of days ago.      The history is provided by the patient and medical records.     Arielle Paul is a 39 year old female with history notable for hidradenitis who presents to the Emergency Department for pruritus and rash in the setting of taking Bactrim, 2 days ago. Patient states she has a history of R axilla abscesses and developed an abscess in her L axilla for the first time, so she saw her PCP. Her PCP started her on bactrim, and 4 hours after taking it, the patient developed pruritus and rash of her neck and back. She states she has never had a reaction like this before. She states she only took the Bactrim the one time and was switched over to clindamycin that she started today. She states she hasn't taken medications for symptomatic management. She also complains of some R ear pain. She otherwise denies swelling of the lips, throat, or tongue, shortness of breath, abdominal pain, or vomiting.     PAST MEDICAL HISTORY  Past Medical History:   Diagnosis Date     Abnormal maternal glucose tolerance, complicating pregnancy, childbirth, or the puerperium, unspecified as to episode of care 2/17/2006    LGA ?.  Refused 3 hr. GTT, to get Diabetic teaching and to start accucheck.     Anemia      Headache(784.0)      PAST SURGICAL HISTORY  Past Surgical History:   Procedure Laterality Date     NO HISTORY OF SURGERY       FAMILY HISTORY  Family History   Problem Relation Age of Onset     Family History Negative No family hx of      Autoimmune Disease No family hx of      SOCIAL HISTORY  Social History     Tobacco Use     Smoking status: Never Smoker     Smokeless tobacco: Never Used   Substance Use Topics     Alcohol use: No     MEDICATIONS  No current facility-administered medications for this  encounter.      Current Outpatient Medications   Medication     cholecalciferol (VITAMIN D3) 02440 units (1250 mcg) capsule     clindamycin (CLEOCIN) 300 MG capsule     diphenhydrAMINE (BENADRYL) 25 MG tablet     methylPREDNISolone (MEDROL DOSEPAK) 4 MG tablet therapy pack     IBUPROFEN PO     trimethoprim-polymyxin b (POLYTRIM) 95688-1.1 UNIT/ML-% ophthalmic solution     ALLERGIES  Allergies   Allergen Reactions     Bactrim [Sulfamethoxazole W/Trimethoprim] Rash       I have reviewed the Medications, Allergies, Past Medical and Surgical History, and Social History in the Epic system.    Review of Systems   Constitutional: Negative for chills and fever.   HENT: Positive for ear pain (R). Negative for trouble swallowing.         No swelling of lips or throat or tongue   Respiratory: Negative for cough, shortness of breath and wheezing.    Cardiovascular: Negative for chest pain.   Gastrointestinal: Negative for abdominal pain and vomiting.   Genitourinary: Negative for dysuria.   Skin: Positive for rash (neck and back).   All other systems reviewed and are negative.      Physical Exam   BP: 111/58  Pulse: 71  Temp: 98  F (36.7  C)  Resp: 16  Weight: 105.5 kg (232 lb 8 oz)  SpO2: 100 %      Physical Exam  Vitals signs and nursing note reviewed.   Constitutional:       General: She is not in acute distress.     Appearance: She is well-developed. She is not diaphoretic.   HENT:      Head: Normocephalic and atraumatic.      Ears:      Comments: R ear canal: injected canal, no drainage, no TM abnormalities     Mouth/Throat:      Comments: No swelling of lips or tongue or oropharynx  No stridor  Normal phonation  Eyes:      Conjunctiva/sclera: Conjunctivae normal.      Pupils: Pupils are equal, round, and reactive to light.   Cardiovascular:      Rate and Rhythm: Normal rate and regular rhythm.      Heart sounds: Normal heart sounds.   Pulmonary:      Effort: Pulmonary effort is normal. No respiratory distress.      Breath  sounds: Normal breath sounds. No wheezing or rales.   Abdominal:      General: Bowel sounds are normal. There is no distension.      Palpations: Abdomen is soft.      Tenderness: There is no tenderness.   Skin:     General: Skin is warm and dry.      Comments: Anterior neck erythematous   Posterior right thorax has approx 8 cm circular erythematous lesion, with dermal swelling   Neurological:      Mental Status: She is alert and oriented to person, place, and time.         ED Course        Procedures   10:04 PM  The patient was seen and examined by Dr. Agarwal in Room 7.                Critical Care time:  none             Labs Ordered and Resulted from Time of ED Arrival Up to the Time of Departure from the ED - No data to display         Assessments & Plan (with Medical Decision Making)   This is a 39 year old female who presents to the Emergency Department today with concerns of allergic reaction.  She is a history of hidradenitis and was given a prescription for Bactrim by her clinic, 2 days ago.  She took 1 dose of Bactrim and approximately 4 hours subsequent to that she began to experience rash and itching particularly of the neck and back.  She notified her clinic who instructed her to discontinue the Bactrim and prescribed clindamycin instead.  She has not had the Bactrim in 2 days but continues to have some pain started as some itching and swelling, which is what prompted her to come here today.  She specifically denies any swelling of the lips, tongue, or throat or any difficulty breathing.    On exam she is alert, cooperative, and in no acute distress.  She does appear to have some erythema along the anterior and right neck.  She also points to a lesion on her back which is approximately 8 cm in diameter which again appears to be a large circular area of dermal swelling.  I do not see obvious urticarial lesions at this time.    Although this could be IgE mediated allergic reaction, drug reaction from  sulfa is also possible.  She has now discontinued the Bactrim.    I have ordered some Benadryl for her here in the Emergency Department.  I will advise her to take Benadryl at home and will also provide a prescription for a Medrol Dosepak.  The patient should continue to take her clindamycin and avoid Bactrim.  Ice packs to the affected areas may be helpful.    I also did talk with her about potentially following up with a surgeon eventually for her recurrent episodes of hidradenitis.      She should come back to the Emergency Department if she is noticing any difficulty with swallowing, swelling of the lips, tongue, or throat, or trouble breathing.  Patient verbalizes understanding.    This part of the medical record was transcribed by Tolu Aggarwal Medical Scribe, from a dictation done by Kaitlin Agarwal MD.        I have reviewed the nursing notes.    I have reviewed the findings, diagnosis, plan and need for follow up with the patient.    Discharge Medication List as of 9/27/2019 10:42 PM      START taking these medications    Details   diphenhydrAMINE (BENADRYL) 25 MG tablet Take 1 tablet (25 mg) by mouth every 6 hours as needed for itching, Disp-30 tablet, R-0, Local Print      methylPREDNISolone (MEDROL DOSEPAK) 4 MG tablet therapy pack Follow Package Directions, Disp-21 tablet, R-0, Local Print             Final diagnoses:   Adverse effect of drug, initial encounter     I, Tolu Aggarwal, am serving as a trained medical scribe to document services personally performed by Kaitlin Agarwal MD, based on the provider's statements to me.      I, Kaitlin Agarwal MD, was physically present and have reviewed and verified the accuracy of this note documented by Tolu Aggarwal.     9/27/2019   [facility], [place], EMERGENCY DEPARTMENT     Kaitlin Agarwal MD  09/28/19 0051

## 2019-09-28 NOTE — ED TRIAGE NOTES
Was prescribed Bactrim DS for abscess under arm. Itching and rash started four hours after taking the medication.

## 2019-11-13 ENCOUNTER — TELEPHONE (OUTPATIENT)
Dept: FAMILY MEDICINE | Facility: CLINIC | Age: 39
End: 2019-11-13

## 2019-11-13 ENCOUNTER — OFFICE VISIT (OUTPATIENT)
Dept: FAMILY MEDICINE | Facility: CLINIC | Age: 39
End: 2019-11-13
Payer: COMMERCIAL

## 2019-11-13 VITALS
DIASTOLIC BLOOD PRESSURE: 82 MMHG | WEIGHT: 231 LBS | SYSTOLIC BLOOD PRESSURE: 119 MMHG | BODY MASS INDEX: 39.44 KG/M2 | HEIGHT: 64 IN | OXYGEN SATURATION: 99 % | HEART RATE: 94 BPM | TEMPERATURE: 98.2 F

## 2019-11-13 DIAGNOSIS — R05.9 COUGH: Primary | ICD-10-CM

## 2019-11-13 PROCEDURE — 99213 OFFICE O/P EST LOW 20 MIN: CPT | Performed by: PHYSICIAN ASSISTANT

## 2019-11-13 RX ORDER — FLUTICASONE PROPIONATE 50 MCG
1 SPRAY, SUSPENSION (ML) NASAL DAILY
Qty: 16 G | Refills: 0 | Status: SHIPPED | OUTPATIENT
Start: 2019-11-13 | End: 2020-04-17

## 2019-11-13 RX ORDER — AZITHROMYCIN 250 MG/1
TABLET, FILM COATED ORAL
Qty: 6 TABLET | Refills: 0 | Status: SHIPPED | OUTPATIENT
Start: 2019-11-13 | End: 2019-11-18

## 2019-11-13 RX ORDER — DEXTROMETHORPHAN POLISTIREX 30 MG/5ML
60 SUSPENSION ORAL 2 TIMES DAILY
Qty: 150 ML | Refills: 1 | Status: SHIPPED | OUTPATIENT
Start: 2019-11-13 | End: 2020-04-17

## 2019-11-13 RX ORDER — BENZONATATE 100 MG/1
100 CAPSULE ORAL 3 TIMES DAILY PRN
Qty: 30 CAPSULE | Refills: 0 | Status: SHIPPED | OUTPATIENT
Start: 2019-11-13 | End: 2020-04-17

## 2019-11-13 ASSESSMENT — MIFFLIN-ST. JEOR: SCORE: 1719.24

## 2019-11-13 NOTE — TELEPHONE ENCOUNTER
The benzonatate is not a covd product-- do you want to change to something else? The generic delsym should be cov'd.  THanks!  [facility]ani Deal [facility]  [phone]

## 2019-11-13 NOTE — PROGRESS NOTES
"Subjective     Arielle Paul is a 39 year old female who presents to clinic today for the following health issues:    HPI   Acute Illness   Acute illness concerns: Cough  Onset: x1 week    Fever: no    Chills/Sweats: no     Headache (location?): no     Sinus Pressure:no    Conjunctivitis:  no    Ear Pain: no    Rhinorrhea: no     Congestion: no     Sore Throat: no      Cough: YES-productive of yellow sputum    Wheeze: no    Decreased Appetite: no    Nausea: no    Vomiting: no    Diarrhea:  no    Dysuria/Freq.: no    Fatigue/Achiness: YES- a little    Sick/Strep Exposure: no     Therapies Tried and outcome: none    Patient notes she is not improving. Had something similar in the past and a Z pack worked.   Cough is the most botehrsome.       Reviewed and updated as needed this visit by Provider         Review of Systems   ROS COMP: Constitutional, HEENT, cardiovascular, pulmonary, gi and gu systems are negative, except as otherwise noted.      Objective    /82 (BP Location: Right arm, Patient Position: Chair, Cuff Size: Adult Large)   Pulse 94   Temp 98.2  F (36.8  C) (Oral)   Ht 1.628 m (5' 4.09\")   Wt 104.8 kg (231 lb)   LMP 11/10/2019   SpO2 99%   BMI 39.54 kg/m    Body mass index is 39.54 kg/m .  Physical Exam   GENERAL: healthy, alert and no distress  HENT: Right ear canal and TM normal- left TM with perforation, nose and mouth without ulcers or lesions  NECK: no adenopathy, no asymmetry, masses, or scars and thyroid normal to palpation  RESP: lungs clear to auscultation - no rales, rhonchi or wheezes  CV: regular rate and rhythm, normal S1 S2, no S3 or S4, no murmur, click or rub,     Diagnostic Test Results:  none         Assessment & Plan       ICD-10-CM    1. Cough R05 benzonatate (TESSALON) 100 MG capsule     fluticasone (FLONASE) 50 MCG/ACT nasal spray     azithromycin (ZITHROMAX) 250 MG tablet   Likely post viral cough. Patient to try tessalon and flonase first and then only fill zpack " next week if not improving.        No follow-ups on file.    Kady Childress PA-C  [facility]

## 2019-11-20 ENCOUNTER — HOSPITAL ENCOUNTER (EMERGENCY)
Facility: CLINIC | Age: 39
Discharge: HOME | End: 2019-11-20
Payer: COMMERCIAL

## 2019-11-20 ENCOUNTER — HOSPITAL ENCOUNTER (EMERGENCY)
Facility: CLINIC | Age: 39
Discharge: HOME | End: 2019-11-20
Attending: EMERGENCY MEDICINE | Admitting: EMERGENCY MEDICINE
Payer: COMMERCIAL

## 2019-11-20 ENCOUNTER — APPOINTMENT (OUTPATIENT)
Dept: GENERAL RADIOLOGY | Facility: CLINIC | Age: 39
End: 2019-11-20
Payer: COMMERCIAL

## 2019-11-20 VITALS
BODY MASS INDEX: 39.54 KG/M2 | RESPIRATION RATE: 16 BRPM | SYSTOLIC BLOOD PRESSURE: 112 MMHG | DIASTOLIC BLOOD PRESSURE: 37 MMHG | HEART RATE: 82 BPM | TEMPERATURE: 98.1 F | OXYGEN SATURATION: 100 % | WEIGHT: 231 LBS

## 2019-11-20 VITALS
RESPIRATION RATE: 18 BRPM | WEIGHT: 231 LBS | OXYGEN SATURATION: 98 % | BODY MASS INDEX: 39.54 KG/M2 | DIASTOLIC BLOOD PRESSURE: 71 MMHG | HEART RATE: 84 BPM | SYSTOLIC BLOOD PRESSURE: 148 MMHG | TEMPERATURE: 98.4 F

## 2019-11-20 DIAGNOSIS — J98.01 BRONCHOSPASM: ICD-10-CM

## 2019-11-20 DIAGNOSIS — R05.9 COUGH: ICD-10-CM

## 2019-11-20 LAB
HCG UR QL: NEGATIVE
INTERNAL QC OK POCT: YES

## 2019-11-20 PROCEDURE — 71046 X-RAY EXAM CHEST 2 VIEWS: CPT

## 2019-11-20 PROCEDURE — 99284 EMERGENCY DEPT VISIT MOD MDM: CPT | Mod: 25 | Performed by: EMERGENCY MEDICINE

## 2019-11-20 PROCEDURE — 99283 EMERGENCY DEPT VISIT LOW MDM: CPT | Mod: GC | Performed by: EMERGENCY MEDICINE

## 2019-11-20 PROCEDURE — 94640 AIRWAY INHALATION TREATMENT: CPT | Performed by: EMERGENCY MEDICINE

## 2019-11-20 PROCEDURE — 81025 URINE PREGNANCY TEST: CPT | Performed by: STUDENT IN AN ORGANIZED HEALTH CARE EDUCATION/TRAINING PROGRAM

## 2019-11-20 PROCEDURE — 25000125 ZZHC RX 250: Performed by: STUDENT IN AN ORGANIZED HEALTH CARE EDUCATION/TRAINING PROGRAM

## 2019-11-20 RX ORDER — BENZONATATE 100 MG/1
100 CAPSULE ORAL 3 TIMES DAILY PRN
Qty: 21 CAPSULE | Refills: 0 | Status: SHIPPED | OUTPATIENT
Start: 2019-11-20 | End: 2019-11-27

## 2019-11-20 RX ORDER — IPRATROPIUM BROMIDE AND ALBUTEROL SULFATE 2.5; .5 MG/3ML; MG/3ML
3 SOLUTION RESPIRATORY (INHALATION) ONCE
Status: COMPLETED | OUTPATIENT
Start: 2019-11-20 | End: 2019-11-20

## 2019-11-20 RX ADMIN — IPRATROPIUM BROMIDE AND ALBUTEROL SULFATE 3 ML: .5; 3 SOLUTION RESPIRATORY (INHALATION) at 22:21

## 2019-11-20 ASSESSMENT — ENCOUNTER SYMPTOMS
SORE THROAT: 0
WOUND: 0
DIARRHEA: 0
RHINORRHEA: 0
EYE REDNESS: 1
FATIGUE: 0
CHOKING: 0
NECK STIFFNESS: 0
LIGHT-HEADEDNESS: 0
SLEEP DISTURBANCE: 1
POLYDIPSIA: 0
ABDOMINAL PAIN: 0
CHEST TIGHTNESS: 0
PALPITATIONS: 0
SINUS PRESSURE: 0
VOMITING: 0
FREQUENCY: 0
FLANK PAIN: 0
WHEEZING: 0
NAUSEA: 0
APPETITE CHANGE: 0
CONSTIPATION: 0
UNEXPECTED WEIGHT CHANGE: 0
BACK PAIN: 0
SINUS PAIN: 0
VOICE CHANGE: 0
NECK PAIN: 0
HEMATURIA: 0
FEVER: 0
HEADACHES: 0
DYSURIA: 0
SHORTNESS OF BREATH: 0
WEAKNESS: 0
JOINT SWELLING: 0
DIZZINESS: 0
EYE PAIN: 0
COUGH: 1
TROUBLE SWALLOWING: 0
NUMBNESS: 0
DIAPHORESIS: 0
CHILLS: 0
BRUISES/BLEEDS EASILY: 0
ARTHRALGIAS: 0

## 2019-11-20 NOTE — ED AVS SNAPSHOT
[facility], Emergency Department  [address]  [facility] [address]  Phone:  [phone]  Fax:  [phone]                                    Arielle Paul   MRN: [id number]    Department:  [facility], Emergency Department   Date of Visit:  11/20/2019           After Visit Summary Signature Page    I have received my discharge instructions, and my questions have been answered. I have discussed any challenges I see with this plan with the nurse or doctor.    ..........................................................................................................................................  Patient/Patient Representative Signature      ..........................................................................................................................................  Patient Representative Print Name and Relationship to Patient    ..................................................               ................................................  Date                                   Time    ..........................................................................................................................................  Reviewed by Signature/Title    ...................................................              ..............................................  Date                                               Time          22EPIC Rev 08/18

## 2019-11-20 NOTE — ED NOTES
"Transport came out to the lobby to bring pt to [facility]. Pt declined stating that she wanted to leave and come back later. It was explained to the pt that there could also be a  wait later today if she was to leave and come back. Pt stated \"that's okay\". Pt had to get her child from school. Declination was explained to pt and signed.   "

## 2019-11-21 NOTE — DISCHARGE INSTRUCTIONS
Thank you for choosing [facility].     Please closely monitor for further symptoms. Return to the Emergency Department if you develop any new or worsening signs or symptoms.    If you received any opiate pain medications or sedatives during your visit, please do not drive for at least 8 hours.     Labs, cultures or final xray interpretations may still need to be reviewed.  We will call you if your plan of care needs to be changed.    Please follow up with your primary care physician or clinic.

## 2019-11-21 NOTE — ED PROVIDER NOTES
"  History     Chief Complaint   Patient presents with     Cough     has been coughing x 3 weeks, placed Z pack \"but it does not help\" Per pt this week has been the worse cough wise     The history is provided by the patient and medical records.   Cough   Associated symptoms: ear pain    Associated symptoms: no chest pain, no chills, no diaphoresis, no fever, no headaches, no rash, no rhinorrhea, no shortness of breath, no sore throat and no wheezing      Arielle Paul is a 39 year old female with history of migraine and anemia who presents with a 3-week history of chronic persistent cough.    The patient reports approximately 3 weeks prior onset of a persistent cough that has not improved despite medical treatment.  Patient states she has a mildly productive cough of white sputum in the morning which progresses to a dry cough throughout the day and night.  Patient states cough is worse when she is supine and improved with sitting upright.  Cough is impairing her sleep and frequently wakes her up at night.  Patient denies any history of recent upper respiratory symptoms that preceded the cough but does endorse mild left ear pain.  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  Patient states that she was seen in clinic 1 week prior where she was diagnosed with a post viral cough and prescribed Tessalon, Flonase, and azithromycin.  She reports these medications did not reduce her cough frequency or severity.  Patient denies any smoking history, drug use or alcohol consumption.  Patient denies any history of GERD, aspiration, fever, chills, or asthma.    I have reviewed the Medications, Allergies, Past Medical and Surgical History, and Social History in the Epic system.    Review of Systems   Constitutional: Negative for appetite change, chills, diaphoresis, fatigue, fever and unexpected weight change.   HENT: Positive for ear pain and hearing loss. Negative for congestion, ear discharge, mouth sores, " postnasal drip, rhinorrhea, sinus pressure, sinus pain, sneezing, sore throat, tinnitus, trouble swallowing and voice change.    Eyes: Positive for redness. Negative for pain and visual disturbance.   Respiratory: Positive for cough. Negative for choking, chest tightness, shortness of breath and wheezing.    Cardiovascular: Negative for chest pain and palpitations.   Gastrointestinal: Negative for abdominal pain, constipation, diarrhea, nausea and vomiting.   Endocrine: Negative for cold intolerance, heat intolerance and polydipsia.   Genitourinary: Negative for dysuria, flank pain, frequency, hematuria, menstrual problem and pelvic pain.   Musculoskeletal: Negative for arthralgias, back pain, gait problem, joint swelling, neck pain and neck stiffness.   Skin: Negative for rash and wound.   Allergic/Immunologic: Negative for environmental allergies and food allergies.   Neurological: Negative for dizziness, weakness, light-headedness, numbness and headaches.   Hematological: Does not bruise/bleed easily.   Psychiatric/Behavioral: Positive for sleep disturbance.   Patient denies any history of burning sensation in her throat, belching, reflux, or globus sensation.    Physical Exam   BP: 128/73  Pulse: 87  Temp: 98.5  F (36.9  C)  Resp: 16  Weight: 104.8 kg (231 lb)  SpO2: 97 %      Physical Exam  Vitals signs and nursing note reviewed.   Constitutional:       Appearance: Normal appearance. She is obese.   HENT:      Head: Normocephalic and atraumatic.      Right Ear: Ear canal and external ear normal. A middle ear effusion is present. Tympanic membrane is erythematous.      Left Ear: Ear canal and external ear normal.      Ears:        Comments: Erythematous right tympanic membrane with fluid present behind membrane.     Nose: Nose normal. No congestion or rhinorrhea.      Mouth/Throat:      Mouth: Mucous membranes are moist.      Pharynx: Oropharynx is clear. Posterior oropharyngeal erythema present.         Comments: Mild bilateral erythema on anterior pharyngeal pillars  Eyes:      Extraocular Movements: Extraocular movements intact.      Pupils: Pupils are equal, round, and reactive to light.      Comments: Bilateral conjunctival injection present   Neck:      Musculoskeletal: Normal range of motion and neck supple. No neck rigidity or muscular tenderness.   Cardiovascular:      Rate and Rhythm: Normal rate and regular rhythm.      Pulses: Normal pulses.      Heart sounds: Normal heart sounds. No murmur.   Pulmonary:      Effort: Pulmonary effort is normal.      Breath sounds: Normal breath sounds. No wheezing.      Comments: Chronic nonproductive cough throughout exam  Abdominal:      General: Bowel sounds are normal.      Palpations: Abdomen is soft. There is no mass.      Tenderness: There is no abdominal tenderness. There is no guarding.   Musculoskeletal:         General: No tenderness or deformity.      Right lower leg: No edema.      Left lower leg: No edema.   Lymphadenopathy:      Cervical: No cervical adenopathy.   Skin:     General: Skin is warm and dry.      Findings: No bruising or rash.   Neurological:      General: No focal deficit present.      Mental Status: She is alert and oriented to person, place, and time.      Cranial Nerves: No cranial nerve deficit.      Sensory: No sensory deficit.      Motor: No weakness.      Gait: Gait normal.   Psychiatric:         Mood and Affect: Mood normal.         Thought Content: Thought content normal.         ED Course        Procedures       CHEST TWO VIEWS   11/20/2019 10:52 PM      HISTORY: Cough for 3 weeks.     COMPARISON: 4/16/2013.     FINDINGS: The lungs are clear. Normal-sized cardiac silhouette.                                                                      IMPRESSION: No evidence of active cardiopulmonary disease.      Critical Care time:  none       Labs Ordered and Resulted from Time of ED Arrival Up to the Time of Departure from the ED   HCG  "QUAL URINE POCT - Normal            Assessments & Plan (with Medical Decision Making)   Arielle Paul is a 39 year old female with history of migraine and anemia who presents with a 3-week history of chronic persistent cough.    Differential diagnosis considered include pneumonia, GERD, post viral cough, asthma and aspiration.    Patient's history is notable for chronic nonproductive cough, itchy throat,  worker with numerous sick contacts, cough refractory to Tessalon, Flonase, dextromethorphan and azithromycin treatments.  She denies any history of URI symptoms patient's examination was notable for ongoing cough, left tympanic membrane erythema with effusion, bilateral conjunctival injection, mild pharyngeal erythema and clear lung sounds.  CXR was clear without any acute abnormalities or evidence of pneumonia.  Of note patient reports cough symptoms resolved with administration of DuoNeb treatment in the ED.    Patient's presentation of chronic cough responsive to DuoNeb therapy and clear chest x-ray is consistent with bronchospasm.  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  Asthma may be considered given her response to therapy. Pneumonia or aspiration is less likely given no abnormal chest x-ray findings.  GERD is less likely given absence of history of heartburn, belching or relation of cough to oral intake.  Of note patient firmly states \"I do not have asthma\" and refused inhaler recommendation.  Patient was determined stable for discharge with outpatient CP follow-up and prescribed benzonatate 100 mg 3 times daily and discharged from the ED    I have reviewed the nursing notes.    I have reviewed the findings, diagnosis, plan and need for follow up with the patient.    New Prescriptions    BENZONATATE (TESSALON) 100 MG CAPSULE    Take 1 capsule (100 mg) by mouth 3 times daily as needed for " cough       Final diagnoses:   Bronchospasm   Cough   This patient was seen and discussed with my attending physician.  Gary Mccurdy MD  PGY-1 Psychiatry Resident Physician      11/20/2019   [facility], [place], EMERGENCY DEPARTMENT     Gary Mccurdy MD  Resident  11/20/19 4178  This data collected with the Resident working in the Emergency Department.  Patient was seen and evaluated by myself and I repeated the history and physical exam with the patient.  The plan of care was discussed with them.  The key portions of the note including the entire assessment and plan reflect my documentation.           Dusty Nielson MD  11/23/19 [id number]

## 2020-04-17 ENCOUNTER — VIRTUAL VISIT (OUTPATIENT)
Dept: FAMILY MEDICINE | Facility: CLINIC | Age: 40
End: 2020-04-17
Payer: COMMERCIAL

## 2020-04-17 ENCOUNTER — TELEPHONE (OUTPATIENT)
Dept: FAMILY MEDICINE | Facility: CLINIC | Age: 40
End: 2020-04-17

## 2020-04-17 DIAGNOSIS — E55.9 VITAMIN D DEFICIENCY: ICD-10-CM

## 2020-04-17 DIAGNOSIS — G43.909 MIGRAINE: ICD-10-CM

## 2020-04-17 DIAGNOSIS — L50.9 URTICARIA: Primary | ICD-10-CM

## 2020-04-17 PROCEDURE — 99442 ZZC PHYSICIAN TELEPHONE EVALUATION 11-20 MIN: CPT | Performed by: FAMILY MEDICINE

## 2020-04-17 RX ORDER — CHOLECALCIFEROL (VITAMIN D3) 50 MCG
TABLET ORAL
Qty: 90 TABLET | Refills: 1 | Status: SHIPPED | OUTPATIENT
Start: 2020-04-17 | End: 2020-12-11

## 2020-04-17 RX ORDER — CHOLECALCIFEROL (VITAMIN D3) 1250 MCG
50000 CAPSULE ORAL
Qty: 8 CAPSULE | Refills: 0 | Status: SHIPPED | OUTPATIENT
Start: 2020-04-17 | End: 2022-08-22

## 2020-04-17 RX ORDER — PREDNISONE 20 MG/1
TABLET ORAL
Qty: 10 TABLET | Refills: 0 | Status: SHIPPED | OUTPATIENT
Start: 2020-04-17 | End: 2020-10-21

## 2020-04-17 RX ORDER — CETIRIZINE HYDROCHLORIDE 10 MG/1
TABLET ORAL
Qty: 30 TABLET | Refills: 3 | Status: SHIPPED | OUTPATIENT
Start: 2020-04-17 | End: 2022-08-22

## 2020-04-17 NOTE — PROGRESS NOTES
"Arielle Paul is a 40 year old female who is being evaluated via a billable telephone visit.      The patient has been notified of following:     \"This telephone visit will be conducted via a call between you and your physician/provider. We have found that certain health care needs can be provided without the need for a physical exam.  This service lets us provide the care you need with a short phone conversation.  If a prescription is necessary we can send it directly to your pharmacy.  If lab work is needed we can place an order for that and you can then stop by our lab to have the test done at a later time.    Telephone visits are billed at different rates depending on your insurance coverage. During this emergency period, for some insurers they may be billed the same as an in-person visit.  Please reach out to your insurance provider with any questions.    If during the course of the call the physician/provider feels a telephone visit is not appropriate, you will not be charged for this service.\"    Patient has given verbal consent for Telephone visit?  Yes    How would you like to obtain your AVS? Mail a copy    Subjective     Arielle Paul is a 40 year old female who presents to clinic today for the following health issues:  Patient reports history of itchy, sneezing, for the past few days.  She has no fever, denies chills, denies short of breath, denies wheezing.  She reports itchy skin.  She reports she has similar episode back in November of last year after she was treated with antibiotic.    Currently, not taking any medication.  Denies changing her clothes, detergent, she is not aware of any new food, or diet.  She has no skin rashes.  No other family member has similar problem.  No history of travel.  She has no fever no chills no cough.    Patient reports history of vitamin D deficiency her last vitamin D level was 15.  She was taking vitamin D supplement 50,000 units 1 tablet weekly.  Then she " ran out.  She denies being fatigued, and has been tired.  No chest pain or shortness of breath.  ALLERGIES     Onset: 7 months     Description:   Nasal congestion: no  Sneezing: no  Red, itchy eyes: no    Progression of Symptoms:  same    Accompanying Signs & Symptoms:  Cough: no  Wheezing: no  Rash: YES- red, itching off and on  Sinus/facial pain: no   History:   Is it seasonal: none   History of Asthma: no  Has allergy testing been done: no    Precipitating factors:   Not sure what cause    Alleviating factors:  None       Therapies Tried and outcome: none  Due to language barrier, an  was present during the history-taking and subsequent discussion (and for part of the physical exam) with this patient.          Patient Active Problem List   Diagnosis     Anemia     Perforated tympanic membrane     Vitamin D deficiency     Edema     Contraception, generic surveillance     Migraine     Polyarthritis     Thyroid nodule     Corns and callosities     Past Surgical History:   Procedure Laterality Date     NO HISTORY OF SURGERY         Social History     Tobacco Use     Smoking status: Never Smoker     Smokeless tobacco: Never Used   Substance Use Topics     Alcohol use: No     Family History   Problem Relation Age of Onset     Family History Negative No family hx of      Autoimmune Disease No family hx of          Current Outpatient Medications   Medication Sig Dispense Refill     cetirizine (ZYRTEC) 10 MG tablet One tab qhs 30 tablet 3     cholecalciferol (VITAMIN D3) 38889 units (1250 mcg) capsule Take 1 capsule (50,000 Units) by mouth every 7 days 8 capsule 0     predniSONE (DELTASONE) 20 MG tablet 2 tab daily for 5 days 10 tablet 0     vitamin D3 (CHOLECALCIFEROL) 2000 units (50 mcg) tablet One tab a day ( start after done with Vit D 50, 000 unit ), after 8 weeks 90 tablet 1     IBUPROFEN PO        Allergies   Allergen Reactions     Bactrim [Sulfamethoxazole W/Trimethoprim] Rash     Recent Labs   Lab Test  05/29/14  1302 03/21/13  1205 03/06/13  1140   A1C 5.8  --   --    LDL 91  --   --    HDL 52  --   --    TRIG 69  --   --    ALT  --   --  30   CR  --   --  0.71   GFRESTIMATED  --   --  >90   GFRESTBLACK  --   --  >90   POTASSIUM  --   --  4.2   TSH  --  1.58  --         Reviewed and updated as needed this visit by Provider         Review of Systems   ROS COMP: Constitutional, HEENT, cardiovascular, pulmonary, gi and gu systems are negative, except as otherwise noted.       Objective   Reported vitals:  There were no vitals taken for this visit.   healthy, alert and no distress  PSYCH: Alert and oriented times 3; coherent speech, normal   rate and volume, able to articulate logical thoughts, able   to abstract reason, no tangential thoughts, no hallucinations   or delusions  Her affect is normal  RESP: No cough, no audible wheezing, able to talk in full sentences  Remainder of exam unable to be completed due to telephone visits    Diagnostic Test Results:  Labs reviewed in Epic  none         Assessment/Plan:  1. Urticaria    - predniSONE (DELTASONE) 20 MG tablet; 2 tab daily for 5 days  Dispense: 10 tablet; Refill: 0  - cetirizine (ZYRTEC) 10 MG tablet; One tab qhs  Dispense: 30 tablet; Refill: 3    2. Vitamin D deficiency    - cholecalciferol (VITAMIN D3) 32208 units (1250 mcg) capsule; Take 1 capsule (50,000 Units) by mouth every 7 days  Dispense: 8 capsule; Refill: 0  - vitamin D3 (CHOLECALCIFEROL) 2000 units (50 mcg) tablet; One tab a day ( start after done with Vit D 50, 000 unit ), after 8 weeks  Dispense: 90 tablet; Refill: 1    No follow-ups on file.      Phone call duration:  13 minutes    Adis Quiroga MD

## 2020-04-17 NOTE — TELEPHONE ENCOUNTER
She had been on high dose Vit D3 so probably the maintenance over the counter is not covered.  Cholecalciferol is what she had in the past.    Looks like the high dose was ordered today?   Did she pick that up?    I called and spoke to pharmacy.  They have the high dose ready for  as well as her other refills prednisone and zyrtec.    The 2000 unit is over the counter so insurance does not cover.    Requires [facility] .    I called   Services, placed call to patient with assistance of [facility]  #[phone].    No answer,  left message on voicemail requesting patient call [facility] RN line at [phone].    Juany Lee, RN  [facility]

## 2020-04-17 NOTE — TELEPHONE ENCOUNTER
Reason for Call:  Medication or medication refill:    Do you use a [facility]?  Name of the pharmacy and phone number for the current request:  [facility] #[id number] - [address] AT [phone] (Phone)  [phone] (Fax)       Name of the medication requested: vitamin D3 (CHOLECALCIFEROL) 2000 units (50 mcg) tabletOne tab a day ( start after done with Vit D 50, 000 unit ), after 8 weeks       Other request: PT stated Vit D3 was covered by insurance but now not covered or this formula.  Asking is it different from the past.    Can we leave a detailed message on this number? YES    Phone number patient can be reached at: Home number on file [phone] (home)    Best Time: Anytime    Call taken on 4/17/2020 at 3:20 PM by Sara Philippe

## 2020-04-21 NOTE — TELEPHONE ENCOUNTER
Attempt # 2  Called [phone] (home) using     Did patient answer the phone: No, left a message on voicemail to return call to triage line at [phone].    Trudi SanchezRN,BSN  [facility]

## 2020-04-24 NOTE — TELEPHONE ENCOUNTER
Please mail letter to patient that she can buy cristhian 2000 units of Vitamin D over the counter.  Please call us back with any questions or concerns.    Trudi KAPLAN,RN  [facility]

## 2020-10-22 ENCOUNTER — OFFICE VISIT (OUTPATIENT)
Dept: FAMILY MEDICINE | Facility: CLINIC | Age: 40
End: 2020-10-22
Payer: COMMERCIAL

## 2020-10-22 VITALS
HEIGHT: 64 IN | WEIGHT: 230 LBS | OXYGEN SATURATION: 98 % | HEART RATE: 74 BPM | BODY MASS INDEX: 39.27 KG/M2 | DIASTOLIC BLOOD PRESSURE: 83 MMHG | TEMPERATURE: 97.7 F | SYSTOLIC BLOOD PRESSURE: 117 MMHG

## 2020-10-22 DIAGNOSIS — N93.8 DUB (DYSFUNCTIONAL UTERINE BLEEDING): ICD-10-CM

## 2020-10-22 DIAGNOSIS — R73.09 ELEVATED GLUCOSE: ICD-10-CM

## 2020-10-22 DIAGNOSIS — E66.01 OBESITY, MORBID, BMI 40.0-49.9 (H): ICD-10-CM

## 2020-10-22 DIAGNOSIS — N92.6 MISSED PERIOD: Primary | ICD-10-CM

## 2020-10-22 LAB
ERYTHROCYTE [DISTWIDTH] IN BLOOD BY AUTOMATED COUNT: 13.3 % (ref 10–15)
FERRITIN SERPL-MCNC: 23 NG/ML (ref 12–150)
HBA1C MFR BLD: 8 % (ref 0–5.6)
HCG UR QL: NEGATIVE
HCT VFR BLD AUTO: 37.3 % (ref 35–47)
HGB BLD-MCNC: 12 G/DL (ref 11.7–15.7)
MCH RBC QN AUTO: 27.9 PG (ref 26.5–33)
MCHC RBC AUTO-ENTMCNC: 32.2 G/DL (ref 31.5–36.5)
MCV RBC AUTO: 87 FL (ref 78–100)
PLATELET # BLD AUTO: 226 10E9/L (ref 150–450)
RBC # BLD AUTO: 4.3 10E12/L (ref 3.8–5.2)
TSH SERPL DL<=0.005 MIU/L-ACNC: 2.16 MU/L (ref 0.4–4)
WBC # BLD AUTO: 4 10E9/L (ref 4–11)

## 2020-10-22 PROCEDURE — 82728 ASSAY OF FERRITIN: CPT | Performed by: INTERNAL MEDICINE

## 2020-10-22 PROCEDURE — 81025 URINE PREGNANCY TEST: CPT | Performed by: INTERNAL MEDICINE

## 2020-10-22 PROCEDURE — 83036 HEMOGLOBIN GLYCOSYLATED A1C: CPT | Performed by: INTERNAL MEDICINE

## 2020-10-22 PROCEDURE — 84443 ASSAY THYROID STIM HORMONE: CPT | Performed by: INTERNAL MEDICINE

## 2020-10-22 PROCEDURE — 36415 COLL VENOUS BLD VENIPUNCTURE: CPT | Performed by: INTERNAL MEDICINE

## 2020-10-22 PROCEDURE — 99214 OFFICE O/P EST MOD 30 MIN: CPT | Performed by: INTERNAL MEDICINE

## 2020-10-22 PROCEDURE — 85027 COMPLETE CBC AUTOMATED: CPT | Performed by: INTERNAL MEDICINE

## 2020-10-22 ASSESSMENT — MIFFLIN-ST. JEOR: SCORE: 1712.24

## 2020-10-22 NOTE — PROGRESS NOTES
"Subjective     Arielle Paul is a 40 year old female who presents to clinic today for the following health issues:  41 y/o  F h/o Morbid Obesity (BMI 39), axillary abcesses, URI/ENT infections (TM rupture 2013) here with menstrual concerns.     SHe has had 5  childbirths and is not on birth control.  Notes  no SOB, weakness, fatigue  HPI         Concern - Abnormal periods LMP was August 28th did not get one in September but has one now and states it is thick and black.  She has some lower abdominal pain, thinks it is menstrual.   The flow is gradually becoming a normal color as of this morning.     Does not use tampons at all.  She is  and has 5 kids.   Last birth was 9Y ago.   She does not use birth control method other than withdrawal.     Diagnosed with RODDY last year, never did take the iron.   hgb was 11, iron was 6.     Periods generally moderate.   No Colon Ca in family    Onset:   Description: abnormal periods  Intensity: mild  Progression of Symptoms:    Accompanying Signs & Symptoms:   Previous history of similar problem:   Precipitating factors:        Worsened by:   Alleviating factors:        Improved by:   Therapies tried and outcome:  none          Review of Systems   Constitutional, HEENT, cardiovascular, pulmonary, gi and gu systems are negative, except as otherwise noted.      Objective    /83 (BP Location: Right arm, Patient Position: Sitting, Cuff Size: Adult Large)   Pulse 74   Temp 97.7  F (36.5  C) (Oral)   Ht 1.632 m (5' 4.25\")   Wt 104.3 kg (230 lb)   SpO2 98%   BMI 39.17 kg/m    Body mass index is 39.17 kg/m .  Physical Exam   GENERAL: healthy, alert and no distress  Overweight.    (female): normal female external genitalia, normal urethral meatus, vaginal mucosa, normal cervix with normal appearance, menstrual blood.   No foreign objects.   MS: no gross musculoskeletal defects noted, no edema    No results found for this or any previous visit (from the past " "24 hour(s)).    see orders.     Assessment & Plan     Arielle was seen today for abnormal bleeding problem.    Diagnoses and all orders for this visit:    Missed period  -     HCG Qual, Urine ([id number])    DUB (dysfunctional uterine bleeding)  -     CBC with platelets  -     TSH with free T4 reflex  -     Ferritin    Obesity, morbid, BMI 40.0-49.9 (H)  -     TSH with free T4 reflex    Elevated glucose  -     Hemoglobin A1c    she may have simply missed a period, or she may have had an early failed pregnancy  The dark blood flow initially noted may have been due to \"old blood\" from the previous cycle.   She understands.  She is agreeable to UPT    She would like iron rechecked.  H/o mild RODDY last year, never treated.  She is willing to take iron if low again.      LATER ENTRY:  A1C 8.0    MD called patient on 10/23 to discuss.  Patient disagreed about the dx and felt it was due to eating sweets recently.   She would like to discuss with Primary Provider.  Letter sent with explanation.   Diabetes added to problem list.       BMI:   Estimated body mass index is 39.17 kg/m  as calculated from the following:    Height as of this encounter: 1.632 m (5' 4.25\").    Weight as of this encounter: 104.3 kg (230 lb).   Weight management plan:                Return in about 4 weeks (around 11/19/2020) for Physical Exam and pap.    Tamara Calloway MD  [facility]    "

## 2020-10-22 NOTE — LETTER
[facility]   [address] [place]  [address]  [phone]                                   October 23, 2020    Arielle Paul  [address]   [place] [place] [address]        Dear Arielle,      Your iron (ferritin)/blood count are perfect.  Pregnancy test is negative/normal.  Thyroid gland test is normal.       The HgbA1C was checked to screen for diabetes.  A level more than 6.5 means diabetes.  Your HgbA1C level is 8.0, so by definition, you have developed diabetes over the past couple of years.  6 years ago you were screened with the HgbA1C and it was 5.8 at that time, normal     A HgbA1C level of 8.0 means that your blood sugars are often in the 200-300 range over the last 3 months.       When we discussed this by phone, you said you would like to discuss this with your provider, Kady Childress.  Do this soon.  In the meantime, working on some weight loss and getting a little more exercise will help this and improve it a lot.       We have a great diabetes education team who can help you, too.  Let us know when you would be ready to see them.     Results for orders placed or performed in visit on 10/22/20   Hemoglobin A1c     Status: Abnormal   Result Value Ref Range    Hemoglobin A1C 8.0 (H) 0 - 5.6 %   CBC with platelets     Status: None   Result Value Ref Range    WBC 4.0 4.0 - 11.0 10e9/L    RBC Count 4.30 3.8 - 5.2 10e12/L    Hemoglobin 12.0 11.7 - 15.7 g/dL    Hematocrit 37.3 35.0 - 47.0 %    MCV 87 78 - 100 fl    MCH 27.9 26.5 - 33.0 pg    MCHC 32.2 31.5 - 36.5 g/dL    RDW 13.3 10.0 - 15.0 %    Platelet Count 226 150 - 450 10e9/L   TSH with free T4 reflex     Status: None   Result Value Ref Range    TSH 2.16 0.40 - 4.00 mU/L   Ferritin     Status: None   Result Value Ref Range    Ferritin 23 12 - 150 ng/mL   HCG Qual, Urine ([id number])     Status: None   Result Value Ref Range    HCG Qual Urine Negative NEG^Negative       If you have any questions please  call the clinic at [phone]    Sincerely,    Tamara Calloway MD    bmd

## 2020-10-23 NOTE — RESULT ENCOUNTER NOTE
Arielle Paul    Your iron (ferritin)/blood count are perfect.  Pregnancy test is negative/normal.  Thyroid gland test is normal.      The HgbA1C was checked to screen for diabetes.  A level more than 6.5 means diabetes.  Your HgbA1C level is 8.0, so by definition, you have developed diabetes over the past couple of years.  6 years ago you were screened with the HgbA1C and it was 5.8 at that time, normal    A HgbA1C level of 8.0 means that your blood sugars are often in the 200-300 range over the last 3 months.      When we discussed this by phone, you said you would like to discuss this with your provider, Kady Childress.  Do this soon.  In the meantime, working on some weight loss and getting a little more exercise will help this and improve it a lot.       We have a great diabetes education team who can help you, too.  Let us know when you would be ready to see them.     Sincerely,     CONCETTA LEPE M.D.

## 2020-12-10 DIAGNOSIS — E55.9 VITAMIN D DEFICIENCY: ICD-10-CM

## 2020-12-11 RX ORDER — CHOLECALCIFEROL (VITAMIN D3) 50 MCG
TABLET ORAL
Qty: 90 TABLET | Refills: 1 | Status: SHIPPED | OUTPATIENT
Start: 2020-12-11 | End: 2021-02-11

## 2020-12-23 ENCOUNTER — OFFICE VISIT (OUTPATIENT)
Dept: ORTHOPEDICS | Facility: CLINIC | Age: 40
End: 2020-12-23
Payer: COMMERCIAL

## 2020-12-23 ENCOUNTER — ANCILLARY PROCEDURE (OUTPATIENT)
Dept: GENERAL RADIOLOGY | Facility: CLINIC | Age: 40
End: 2020-12-23
Attending: ORTHOPAEDIC SURGERY
Payer: COMMERCIAL

## 2020-12-23 VITALS
DIASTOLIC BLOOD PRESSURE: 84 MMHG | OXYGEN SATURATION: 99 % | BODY MASS INDEX: 39.27 KG/M2 | HEART RATE: 83 BPM | HEIGHT: 64 IN | SYSTOLIC BLOOD PRESSURE: 122 MMHG | WEIGHT: 230 LBS

## 2020-12-23 DIAGNOSIS — M87.9 KNEE OSTEONECROSIS, LEFT (H): ICD-10-CM

## 2020-12-23 DIAGNOSIS — M17.12 OSTEOARTHRITIS OF LEFT KNEE, UNSPECIFIED OSTEOARTHRITIS TYPE: Primary | ICD-10-CM

## 2020-12-23 DIAGNOSIS — M25.562 LEFT KNEE PAIN, UNSPECIFIED CHRONICITY: ICD-10-CM

## 2020-12-23 PROCEDURE — 99203 OFFICE O/P NEW LOW 30 MIN: CPT | Performed by: ORTHOPAEDIC SURGERY

## 2020-12-23 PROCEDURE — 73562 X-RAY EXAM OF KNEE 3: CPT | Mod: LT | Performed by: RADIOLOGY

## 2020-12-23 ASSESSMENT — MIFFLIN-ST. JEOR: SCORE: 1712.24

## 2020-12-23 NOTE — LETTER
"    12/23/2020         RE: Arielle Paul  [address]  [place] [facility] [address]        Dear Colleague,    Thank you for referring your patient, Arielle Paul, to the [facility]. Please see a copy of my visit note below.     SUBJECTIVE:   Arielle Paul is a 40 year old female who is seen as self referral for evaluation of left knee pain.  Duration: 1 week   No known injury.      Present symptoms: pain sitting and flexing knee.    No swelling  No pain walking except pain getting out of chair  Anteromedial pain  Some patellar area popping. No giving-way   No pain kneeling to pray.  (?) but pain getting up     Treatments tried to this point: vitamin D.    Previous knee issues: history of \"minimal\" osteoarthritis left knee in 2012    Past Medical History:   Recent diagnosis of type 2 diabetes  Past Medical History:   Diagnosis Date     Abnormal maternal glucose tolerance, complicating pregnancy, childbirth, or the puerperium, unspecified as to episode of care 2/17/2006    LGA ?.  Refused 3 hr. GTT, to get Diabetic teaching and to start accucheck.     Anemia      Headache(784.0)      Past Surgical History:   Past Surgical History:   Procedure Laterality Date     NO HISTORY OF SURGERY       Family History:   Family History   Problem Relation Age of Onset     Family History Negative No family hx of      Autoimmune Disease No family hx of      Social History:   Social History     Tobacco Use     Smoking status: Never Smoker     Smokeless tobacco: Never Used   Substance Use Topics     Alcohol use: No       Review of Systems:  Constitutional:  NEGATIVE for fever, chills, change in weight  Integumentary/Skin:  NEGATIVE for worrisome rashes, moles or lesions  Eyes:  NEGATIVE for vision changes or irritation  ENT/Mouth:  NEGATIVE for ear, mouth and throat problems  Resp:  NEGATIVE for significant cough or SOB  Breast:  NEGATIVE for masses, tenderness or discharge  CV:  NEGATIVE " "for chest pain, palpitations or peripheral edema  GI:  NEGATIVE for nausea, abdominal pain, heartburn, or change in bowel habits  :  Negative   Musculoskeletal:  See HPI above  Neuro:  NEGATIVE for weakness, dizziness or paresthesias  Endocrine:  NEGATIVE for temperature intolerance, skin/hair changes  Heme/allergy/immune:  NEGATIVE for bleeding problems  Psychiatric:  NEGATIVE for changes in mood or affect      OBJECTIVE:  Physical Exam:  /84 (BP Location: Left arm, Patient Position: Sitting, Cuff Size: Adult Regular)   Pulse 83   Ht 1.632 m (5' 4.25\")   Wt 104.3 kg (230 lb)   SpO2 99%   BMI 39.17 kg/m    General Appearance: healthy, alert and no distress   Skin: no suspicious lesions or rashes  Neuro: Normal strength and tone, mentation intact and speech normal  Vascular: good pulses, and cappillary refill   Lymph: no lymphadenopathy   Psych:  mentation appears normal and affect normal/bright  Resp: no increased work of breathing     Left Knee Exam:  Gait: walks with normal gait  Alignment: normal   Squat: 40 % painful.    Patellofemoral joint: moderate crepitations in the patellofemoral joint, which seem to focus medial to patella  Effusion: mild  ROM: 0-135, minimal pain  Tender: just inferior to medial plica region with the knee extended.  With the knee flexed to 90, there seems to be a   Question of a small prominence at or just above the medial joint line anteriorly, which corresponds to the tender area in extension.  Or this may be the normal contour of her knee as the other knee does feel similar.  Ligaments:   Lachman's: stable   Anterior/Posterior drawer: stable,   Varus/Valgus stress: stable to varus and valgus stress  McMurrays: negative    X-rays:  Obtained today, reviewed in the office with the patient today:  Some medial femoral condyle sclerosis consistent with osteonecrosis on the AP with maybe a small intraosseous cyst, but I don't see the corresponding findings on the lateral " view.  There seems to be some medial joint space narrowing, progressed somewhat from 2012 xrays. There may have been some subchondral sclerosis on xrays from 2012 as well.  Moderately sized patellar osteophytes noted as well today.       ASSESSMENT:   Encounter Diagnoses   Name Primary?     Osteoarthritis of left knee, unspecified osteoarthritis type      possible knee osteonecrosis, left (H)      Left knee pain, unspecified chronicity Yes        PLAN:   We discussed several options.  I think because the symptoms have been short-lived, that starting some physical therapy, using anti-inflammatories including Voltaren gel to the area would be helpful.  If no improvement, could consider local corticosteroid injection to the painful area.  MRI to assess for a medial meniscus tear and for osteonecrosis is an option in the future as well.  She understands that weight loss is important.  Avoiding running and jumping as her exercises would help as well.    Return to clinic: as needed     ONIEL Abad MD  Dept. Orthopedic Surgery  [facility]       Again, thank you for allowing me to participate in the care of your patient.        Sincerely,        Chris Abad MD

## 2020-12-23 NOTE — PROGRESS NOTES
" SUBJECTIVE:   Arielle Paul is a 40 year old female who is seen as self referral for evaluation of left knee pain.  Duration: 1 week   No known injury.      Present symptoms: pain sitting and flexing knee.    No swelling  No pain walking except pain getting out of chair  Anteromedial pain  Some patellar area popping. No giving-way   No pain kneeling to pray.  (?) but pain getting up     Treatments tried to this point: vitamin D.    Previous knee issues: history of \"minimal\" osteoarthritis left knee in 2012    Past Medical History:   Recent diagnosis of type 2 diabetes  Past Medical History:   Diagnosis Date     Abnormal maternal glucose tolerance, complicating pregnancy, childbirth, or the puerperium, unspecified as to episode of care 2/17/2006    LGA ?.  Refused 3 hr. GTT, to get Diabetic teaching and to start accucheck.     Anemia      Headache(784.0)      Past Surgical History:   Past Surgical History:   Procedure Laterality Date     NO HISTORY OF SURGERY       Family History:   Family History   Problem Relation Age of Onset     Family History Negative No family hx of      Autoimmune Disease No family hx of      Social History:   Social History     Tobacco Use     Smoking status: Never Smoker     Smokeless tobacco: Never Used   Substance Use Topics     Alcohol use: No       Review of Systems:  Constitutional:  NEGATIVE for fever, chills, change in weight  Integumentary/Skin:  NEGATIVE for worrisome rashes, moles or lesions  Eyes:  NEGATIVE for vision changes or irritation  ENT/Mouth:  NEGATIVE for ear, mouth and throat problems  Resp:  NEGATIVE for significant cough or SOB  Breast:  NEGATIVE for masses, tenderness or discharge  CV:  NEGATIVE for chest pain, palpitations or peripheral edema  GI:  NEGATIVE for nausea, abdominal pain, heartburn, or change in bowel habits  :  Negative   Musculoskeletal:  See HPI above  Neuro:  NEGATIVE for weakness, dizziness or paresthesias  Endocrine:  NEGATIVE for " "temperature intolerance, skin/hair changes  Heme/allergy/immune:  NEGATIVE for bleeding problems  Psychiatric:  NEGATIVE for changes in mood or affect      OBJECTIVE:  Physical Exam:  /84 (BP Location: Left arm, Patient Position: Sitting, Cuff Size: Adult Regular)   Pulse 83   Ht 1.632 m (5' 4.25\")   Wt 104.3 kg (230 lb)   SpO2 99%   BMI 39.17 kg/m    General Appearance: healthy, alert and no distress   Skin: no suspicious lesions or rashes  Neuro: Normal strength and tone, mentation intact and speech normal  Vascular: good pulses, and cappillary refill   Lymph: no lymphadenopathy   Psych:  mentation appears normal and affect normal/bright  Resp: no increased work of breathing     Left Knee Exam:  Gait: walks with normal gait  Alignment: normal   Squat: 40 % painful.    Patellofemoral joint: moderate crepitations in the patellofemoral joint, which seem to focus medial to patella  Effusion: mild  ROM: 0-135, minimal pain  Tender: just inferior to medial plica region with the knee extended.  With the knee flexed to 90, there seems to be a   Question of a small prominence at or just above the medial joint line anteriorly, which corresponds to the tender area in extension.  Or this may be the normal contour of her knee as the other knee does feel similar.  Ligaments:   Lachman's: stable   Anterior/Posterior drawer: stable,   Varus/Valgus stress: stable to varus and valgus stress  McMurrays: negative    X-rays:  Obtained today, reviewed in the office with the patient today:  Some medial femoral condyle sclerosis consistent with osteonecrosis on the AP with maybe a small intraosseous cyst, but I don't see the corresponding findings on the lateral view.  There seems to be some medial joint space narrowing, progressed somewhat from 2012 xrays. There may have been some subchondral sclerosis on xrays from 2012 as well.  Moderately sized patellar osteophytes noted as well today.       ASSESSMENT:   Encounter " Diagnoses   Name Primary?     Osteoarthritis of left knee, unspecified osteoarthritis type      possible knee osteonecrosis, left (H)      Left knee pain, unspecified chronicity Yes        PLAN:   We discussed several options.  I think because the symptoms have been short-lived, that starting some physical therapy, using anti-inflammatories including Voltaren gel to the area would be helpful.  If no improvement, could consider local corticosteroid injection to the painful area.  MRI to assess for a medial meniscus tear and for osteonecrosis is an option in the future as well.  She understands that weight loss is important.  Avoiding running and jumping as her exercises would help as well.    Return to clinic: as needed     ONIEL Abad MD  Dept. Orthopedic Surgery  [facility]

## 2021-02-11 ENCOUNTER — TELEPHONE (OUTPATIENT)
Dept: FAMILY MEDICINE | Facility: CLINIC | Age: 41
End: 2021-02-11

## 2021-02-11 DIAGNOSIS — E55.9 VITAMIN D DEFICIENCY: Primary | ICD-10-CM

## 2021-02-11 RX ORDER — VITAMIN B COMPLEX
2 TABLET ORAL DAILY
Qty: 180 TABLET | Refills: 3 | Status: SHIPPED | OUTPATIENT
Start: 2021-02-11 | End: 2022-08-22

## 2021-02-11 NOTE — TELEPHONE ENCOUNTER
Drug Change Request: Re: vitamin D3 (CHOLECALCIFEROL) 50 mcg (2000 units) tablet    Drug not covered by patient plan. The preferred alternative is: Vitamin D 1000mg  Please call/fax the pharmacy to change medication along with strength, directions, quantity and refills.

## 2021-02-11 NOTE — TELEPHONE ENCOUNTER
Routing to PCP    CHOLECALCIFEROL not covered by patients plan    Preferred alternative is    Vitamin D 1000mg    Song Gunn, RN, BSN, PHN  [facility]

## 2021-02-11 NOTE — TELEPHONE ENCOUNTER
Message does not indicate which pharmacy this should go to.   Prescription pended RN can send to preferred pharmacy.   Kady Childress PA-C

## 2021-02-11 NOTE — TELEPHONE ENCOUNTER
Assume same pharmacy as where the previous Vit D not covered was sent so [facility].    Rx sent as directed.    Juany Lee RN  [facility]

## 2021-09-27 ENCOUNTER — HOSPITAL ENCOUNTER (EMERGENCY)
Facility: CLINIC | Age: 41
Discharge: HOME | End: 2021-09-27
Attending: EMERGENCY MEDICINE | Admitting: EMERGENCY MEDICINE
Payer: COMMERCIAL

## 2021-09-27 VITALS
RESPIRATION RATE: 16 BRPM | TEMPERATURE: 98 F | OXYGEN SATURATION: 100 % | HEART RATE: 71 BPM | BODY MASS INDEX: 37.64 KG/M2 | SYSTOLIC BLOOD PRESSURE: 127 MMHG | WEIGHT: 221 LBS | DIASTOLIC BLOOD PRESSURE: 86 MMHG

## 2021-09-27 DIAGNOSIS — T16.2XXA FOREIGN BODY OF LEFT EAR, INITIAL ENCOUNTER: ICD-10-CM

## 2021-09-27 PROCEDURE — 99282 EMERGENCY DEPT VISIT SF MDM: CPT | Performed by: EMERGENCY MEDICINE

## 2021-09-27 ASSESSMENT — ENCOUNTER SYMPTOMS
COUGH: 0
NAUSEA: 0
ABDOMINAL PAIN: 0
VOMITING: 0
DYSURIA: 0
DIARRHEA: 0
SHORTNESS OF BREATH: 0
FEVER: 0

## 2021-09-27 NOTE — ED PROVIDER NOTES
[facility] EMERGENCY DEPARTMENT ([facility])  9/27/21    History     Chief Complaint   Patient presents with     Foreign Body in Ear     reports q tip may be stuck inside ear      The history is provided by the patient and medical records.   Foreign Body in Ear  Associated symptoms: ear pain (left)    Associated symptoms: no abdominal pain, no cough, no nausea and no vomiting      Arielle Paul is a 41 year old female who presents to the Emergency Department for evaluation of a foreign body stuck in the ear.  Patient reports that she was using a Q-tip this morning and thinks that part of it is still inside her left ear.  She states that her left ear was not hurting before she used a Q-tip, it was just itching.  She denies fever, cough, chest pain, nausea, vomiting, or diarrhea.    Past Medical History  Past Medical History:   Diagnosis Date     Abnormal maternal glucose tolerance, complicating pregnancy, childbirth, or the puerperium, unspecified as to episode of care 2/17/2006    LGA ?.  Refused 3 hr. GTT, to get Diabetic teaching and to start accucheck.     Anemia      Headache(784.0)      Past Surgical History:   Procedure Laterality Date     NO HISTORY OF SURGERY       cetirizine (ZYRTEC) 10 MG tablet  cholecalciferol (VITAMIN D3) 51107 units (1250 mcg) capsule  IBUPROFEN PO  Vitamin D3 (CHOLECALCIFEROL) 25 mcg (1000 units) tablet      Allergies   Allergen Reactions     Bactrim [Sulfamethoxazole W/Trimethoprim] Rash     Family History  Family History   Problem Relation Age of Onset     Family History Negative No family hx of      Autoimmune Disease No family hx of      Social History   Social History     Tobacco Use     Smoking status: Never Smoker     Smokeless tobacco: Never Used   Substance Use Topics     Alcohol use: No     Drug use: No      Past medical history, past surgical history, medications, allergies, family history, and social history were reviewed with the patient. No additional pertinent  items.       Review of Systems   Constitutional: Negative for fever.   HENT: Positive for ear pain (left).    Respiratory: Negative for cough and shortness of breath.    Cardiovascular: Negative for chest pain.   Gastrointestinal: Negative for abdominal pain, diarrhea, nausea and vomiting.   Genitourinary: Negative for dysuria.   All other systems reviewed and are negative.    A complete review of systems was performed with pertinent positives and negatives noted in the HPI, and all other systems negative.    Physical Exam   BP: 127/86  Pulse: 71  Temp: 98  F (36.7  C)  Resp: 16  Weight: 100.2 kg (221 lb)  SpO2: 100 %  Physical Exam    GEN:  Well developed, no acute distress  HEENT:  EOMI, Left ear canal has white cotton-like foreign body.   Neck: No swelling, no tenderness, no palpable lymph node swelling  Musculoskeletal:  normal range of motion, no lower extremity swelling or calf tenderness  Neuro:  Alert and oriented X3, Follows commands, moving all extremities spontaneously   Skin:  Warm, dry  ED Course     10:20 AM  The patient was seen and examined by Carmelita Shin MD in Room ED07.   Procedures     Left ear foreign body was removed using non-toothed Adson forceps.  Patient tolerated this well.  Examination of the left ear reveals shiny, normal color left tympanic membrane.  No visible rupture of the TM, no visible drainage, canal appears normal.       No results found for any visits on 09/27/21.  Medications - No data to display     Assessments & Plan (with Medical Decision Making)   Accidental left ear foreign body, status post removal without complication.  No sign of otitis media or otitis externa.    I have reviewed the nursing notes. I have reviewed the findings, diagnosis, plan and need for follow up with the patient.    Discharge Medication List as of 9/27/2021 10:45 AM          Final diagnoses:   Foreign body of left ear, initial encounter     ICarola, am serving as a trained medical  scribe to document services personally performed by Carmelita Shin MD, based on the provider's statements to me.     I, Carmelita Shin MD, was physically present and have reviewed and verified the accuracy of this note documented by Carola Cabrera.  --  Carmelita Shin MD  [facility] EMERGENCY DEPARTMENT  9/27/2021     Carmelita Shin MD  09/27/21 1249

## 2021-09-27 NOTE — DISCHARGE INSTRUCTIONS
Please make an appointment to follow up with Your Primary Care Provider in 5 days if questions or concerns.

## 2021-09-27 NOTE — ED TRIAGE NOTES
Reports that she was using a q tip this morning and feels it may be still inside of left ear. Ear is painful and itchy.

## 2022-08-17 PROBLEM — E11.9 DIABETES MELLITUS, TYPE 2 (H): Status: ACTIVE | Noted: 2022-08-17

## 2022-08-18 ENCOUNTER — OFFICE VISIT (OUTPATIENT)
Dept: INTERNAL MEDICINE | Facility: CLINIC | Age: 42
End: 2022-08-18
Payer: COMMERCIAL

## 2022-08-18 VITALS
WEIGHT: 232.2 LBS | OXYGEN SATURATION: 99 % | TEMPERATURE: 97.9 F | HEART RATE: 77 BPM | DIASTOLIC BLOOD PRESSURE: 64 MMHG | SYSTOLIC BLOOD PRESSURE: 106 MMHG | RESPIRATION RATE: 14 BRPM | BODY MASS INDEX: 39.55 KG/M2

## 2022-08-18 DIAGNOSIS — E55.9 VITAMIN D DEFICIENCY: ICD-10-CM

## 2022-08-18 DIAGNOSIS — E61.1 LOW IRON: ICD-10-CM

## 2022-08-18 DIAGNOSIS — E11.9 TYPE 2 DIABETES MELLITUS WITHOUT COMPLICATION, UNSPECIFIED WHETHER LONG TERM INSULIN USE (H): Primary | ICD-10-CM

## 2022-08-18 DIAGNOSIS — E66.01 MORBID OBESITY (H): ICD-10-CM

## 2022-08-18 LAB
ANION GAP SERPL CALCULATED.3IONS-SCNC: 5 MMOL/L (ref 3–14)
BASOPHILS # BLD AUTO: 0 10E3/UL (ref 0–0.2)
BASOPHILS NFR BLD AUTO: 1 %
BUN SERPL-MCNC: 9 MG/DL (ref 7–30)
CALCIUM SERPL-MCNC: 9.1 MG/DL (ref 8.5–10.1)
CHLORIDE BLD-SCNC: 108 MMOL/L (ref 94–109)
CO2 SERPL-SCNC: 26 MMOL/L (ref 20–32)
CREAT SERPL-MCNC: 0.78 MG/DL (ref 0.52–1.04)
DEPRECATED CALCIDIOL+CALCIFEROL SERPL-MC: 40 UG/L (ref 20–75)
EOSINOPHIL # BLD AUTO: 0.2 10E3/UL (ref 0–0.7)
EOSINOPHIL NFR BLD AUTO: 5 %
ERYTHROCYTE [DISTWIDTH] IN BLOOD BY AUTOMATED COUNT: 14.4 % (ref 10–15)
FERRITIN SERPL-MCNC: 13 NG/ML (ref 12–150)
GFR SERPL CREATININE-BSD FRML MDRD: >90 ML/MIN/1.73M2
GLUCOSE BLD-MCNC: 149 MG/DL (ref 70–99)
HBA1C MFR BLD: 8.2 % (ref 0–5.6)
HCT VFR BLD AUTO: 34.6 % (ref 35–47)
HGB BLD-MCNC: 11.1 G/DL (ref 11.7–15.7)
IRON SATN MFR SERPL: 19 % (ref 15–46)
IRON SERPL-MCNC: 81 UG/DL (ref 35–180)
LYMPHOCYTES # BLD AUTO: 1.9 10E3/UL (ref 0.8–5.3)
LYMPHOCYTES NFR BLD AUTO: 45 %
MCH RBC QN AUTO: 27.3 PG (ref 26.5–33)
MCHC RBC AUTO-ENTMCNC: 32.1 G/DL (ref 31.5–36.5)
MCV RBC AUTO: 85 FL (ref 78–100)
MONOCYTES # BLD AUTO: 0.5 10E3/UL (ref 0–1.3)
MONOCYTES NFR BLD AUTO: 11 %
NEUTROPHILS # BLD AUTO: 1.6 10E3/UL (ref 1.6–8.3)
NEUTROPHILS NFR BLD AUTO: 38 %
PLATELET # BLD AUTO: 263 10E3/UL (ref 150–450)
POTASSIUM BLD-SCNC: 4.1 MMOL/L (ref 3.4–5.3)
RBC # BLD AUTO: 4.07 10E6/UL (ref 3.8–5.2)
SODIUM SERPL-SCNC: 139 MMOL/L (ref 133–144)
TIBC SERPL-MCNC: 430 UG/DL (ref 240–430)
WBC # BLD AUTO: 4.1 10E3/UL (ref 4–11)

## 2022-08-18 PROCEDURE — 80048 BASIC METABOLIC PNL TOTAL CA: CPT | Performed by: INTERNAL MEDICINE

## 2022-08-18 PROCEDURE — 85025 COMPLETE CBC W/AUTO DIFF WBC: CPT | Performed by: INTERNAL MEDICINE

## 2022-08-18 PROCEDURE — 99214 OFFICE O/P EST MOD 30 MIN: CPT | Performed by: INTERNAL MEDICINE

## 2022-08-18 PROCEDURE — 82306 VITAMIN D 25 HYDROXY: CPT | Performed by: INTERNAL MEDICINE

## 2022-08-18 PROCEDURE — 83036 HEMOGLOBIN GLYCOSYLATED A1C: CPT | Performed by: INTERNAL MEDICINE

## 2022-08-18 PROCEDURE — 83550 IRON BINDING TEST: CPT | Performed by: INTERNAL MEDICINE

## 2022-08-18 PROCEDURE — 36415 COLL VENOUS BLD VENIPUNCTURE: CPT | Performed by: INTERNAL MEDICINE

## 2022-08-18 PROCEDURE — 82728 ASSAY OF FERRITIN: CPT | Performed by: INTERNAL MEDICINE

## 2022-08-18 NOTE — PROGRESS NOTES
"  Assessment & Plan     Type 2 diabetes mellitus without complication, unspecified whether long term insulin use (H)  Today is my first office visit with this patient . She was seen once before by one of my partners, Dr. Calloway from [facility] October 2020 and not since. Patient comes with concerns for laboratory studies and did not really want to get into any significant additional discussion of symptoms today. She has complaints of fatigue and wants testing. In my chart review I found that her last hemoglobin a1c  [ diabetes test ] was 8% and unfortunately from the sound of things this patient has not accepted her diagnosis of diabetes mellitus. She talked with me about that she \" wasn't fasting when that laboratory was collected\", and I did not get the feeling that this patient was listening to me when I commented that being in a non-fasting state would only adversely affect the blood glucose but would have no affect on the hemoglobin a1c  [ diabetes test ]. Today is the first opportunity for a recheck hemoglobin a1c  [ diabetes test ] so I am concerned with what we will find. If in point of fact her hemoglobin a1c  [ diabetes test ] is wildly out of control  This alone would explain essentially al patient symptoms . Further follow up is depending on the test results   - Hemoglobin A1c  - Basic metabolic panel  (Ca, Cl, CO2, Creat, Gluc, K, Na, BUN)    Low iron  She says she has heavy menstrual periods and was diagnosed before with low iron so teating complete blood count and ist is certainly reasonable   - CBC with platelets and differential  - Iron and iron binding capacity  - Ferritin    Vitamin D deficiency  Also told of her low Vitamin D levels    - Vitamin D Deficiency    Morbid obesity (H)  The elephant in the room is her super-morbid obesity with body mass index above 40 which would also be potentially a major contributing factors to fatigue   Body mass index is 39.55 " kg/m .        Review of the result(s) of each unique test - todays ;labs  36 minutes spent on the date of the encounter doing chart review, history and exam, documentation and further activities per the note      Return in about 3 months (around 11/18/2022).    Mariusz Preciado MD  [facility] KRANTHI Guzmán is a 42 year oldpresenting for the following health issues:  Lab Only (Lab test, iron and vitamin d )      HPI     New patient to the Internal Medicine department   Need to establish the goals of this office visit   Last visit long ago  Hemoglobin a1c  [ diabetes test ] was 8 with no diabetes mellitus diagnosis on chart and no medications   Health Maintenance Due   Topic Date Due     ANNUAL REVIEW OF  ORDERS  Never done     ADVANCE CARE PLANNING  Never done     COVID-19 Vaccine (1) Never done     PAP  04/06/2014     PREVENTIVE CARE VISIT  05/29/2015     PHQ-2 (once per calendar year)  01/01/2022     Patient wants to see if abnormal laboratory studies are causing her tiredness and leg pain   Had gestational diabetes 3 years ago,   Patient discounts diabetes mellitus diagnosis because she says she was eating when she last had the test. I tried to get patient to understand that the hemoglobin a1c  [ diabetes test ] is entirely independent of any relationship with the hemoglobin a1c  [ diabetes test ] and would not affect the glucose [ sugar test ]     She is in a fasting state today     Lab Results   Component Value Date    A1C 8.0 10/22/2020    A1C 5.8 05/29/2014    A1C 6.3 09/26/2008    A1C 6.6 08/28/2008    A1C 7.0 07/28/2008        Answers for HPI/ROS submitted by the patient on 8/18/2022  What is the reason for your visit today? : Check Vitamin and iron  How many servings of fruits and vegetables do you eat daily?: 2-3  On average, how many sweetened beverages do you drink each day (Examples: soda, juice, sweet tea, etc.  Do NOT count diet or artificially sweetened beverages)?: 0  How  many minutes a day do you exercise enough to make your heart beat faster?: 30 to 60  How many days a week do you exercise enough to make your heart beat faster?: 7  How many days per week do you miss taking your medication?: 0        Review of Systems   Constitutional, HEENT, cardiovascular, pulmonary, gi and gu systems are negative, except as otherwise noted.      Objective    /64   Pulse 77   Temp 97.9  F (36.6  C) (Oral)   Resp 14   Wt 105.3 kg (232 lb 3.2 oz)   SpO2 99%   BMI 39.55 kg/m    Body mass index is 39.55 kg/m .  Physical Exam   GENERAL: healthy, alert and no distress  EYES: Eyes grossly normal to inspection, PERRL and conjunctivae and sclerae normal  MS: no gross musculoskeletal defects noted, no edema  NEURO: Normal strength and tone, mentation intact and speech normal  PSYCH: mentation appears normal, affect normal/bright    Orders Placed This Encounter   Procedures     Hemoglobin A1c     Basic metabolic panel  (Ca, Cl, CO2, Creat, Gluc, K, Na, BUN)     Iron and iron binding capacity     Ferritin     Vitamin D Deficiency     CBC with platelets and differential     CBC with platelets and differential     .  ..

## 2022-08-30 ENCOUNTER — TELEPHONE (OUTPATIENT)
Dept: FAMILY MEDICINE | Facility: CLINIC | Age: 42
End: 2022-08-30

## 2022-08-30 NOTE — TELEPHONE ENCOUNTER
Diabetes Education Scheduling Outreach #1:    Call to patient to schedule. Left message with phone number to call to schedule.    Plan for 2nd outreach attempt within 1 week.    Sandra Sanchez  [place] [facility]  Diabetes and Nutrition Scheduling

## 2022-09-06 NOTE — TELEPHONE ENCOUNTER
Diabetes Education Scheduling Outreach #2:    Call to patient to schedule. Patient stated she will call us back.    [facility]  Diabetes and Nutrition Scheduling

## 2023-02-23 ENCOUNTER — HOSPITAL ENCOUNTER (EMERGENCY)
Facility: CLINIC | Age: 43
Discharge: HOME | End: 2023-02-23
Attending: EMERGENCY MEDICINE | Admitting: EMERGENCY MEDICINE
Payer: COMMERCIAL

## 2023-02-23 VITALS
TEMPERATURE: 98.3 F | SYSTOLIC BLOOD PRESSURE: 138 MMHG | RESPIRATION RATE: 18 BRPM | DIASTOLIC BLOOD PRESSURE: 89 MMHG | WEIGHT: 218 LBS | OXYGEN SATURATION: 100 % | HEIGHT: 64 IN | BODY MASS INDEX: 37.22 KG/M2 | HEART RATE: 62 BPM

## 2023-02-23 DIAGNOSIS — R11.2 NAUSEA AND VOMITING, UNSPECIFIED VOMITING TYPE: ICD-10-CM

## 2023-02-23 DIAGNOSIS — R10.13 EPIGASTRIC PAIN: ICD-10-CM

## 2023-02-23 LAB
ALBUMIN SERPL BCG-MCNC: 4.1 G/DL (ref 3.5–5.2)
ALP SERPL-CCNC: 61 U/L (ref 35–104)
ALT SERPL W P-5'-P-CCNC: 42 U/L (ref 10–35)
ANION GAP SERPL CALCULATED.3IONS-SCNC: 11 MMOL/L (ref 7–15)
AST SERPL W P-5'-P-CCNC: 55 U/L (ref 10–35)
BASOPHILS # BLD AUTO: 0 10E3/UL (ref 0–0.2)
BASOPHILS NFR BLD AUTO: 0 %
BILIRUB SERPL-MCNC: 1 MG/DL
BUN SERPL-MCNC: 15 MG/DL (ref 6–20)
CALCIUM SERPL-MCNC: 9.3 MG/DL (ref 8.6–10)
CHLORIDE SERPL-SCNC: 100 MMOL/L (ref 98–107)
CREAT SERPL-MCNC: 1 MG/DL (ref 0.51–0.95)
DEPRECATED HCO3 PLAS-SCNC: 28 MMOL/L (ref 22–29)
EOSINOPHIL # BLD AUTO: 0.1 10E3/UL (ref 0–0.7)
EOSINOPHIL NFR BLD AUTO: 1 %
ERYTHROCYTE [DISTWIDTH] IN BLOOD BY AUTOMATED COUNT: 13.2 % (ref 10–15)
GFR SERPL CREATININE-BSD FRML MDRD: 72 ML/MIN/1.73M2
GLUCOSE SERPL-MCNC: 117 MG/DL (ref 70–99)
HCG SERPL QL: NEGATIVE
HCT VFR BLD AUTO: 39.1 % (ref 35–47)
HGB BLD-MCNC: 12 G/DL (ref 11.7–15.7)
IMM GRANULOCYTES # BLD: 0 10E3/UL
IMM GRANULOCYTES NFR BLD: 0 %
LIPASE SERPL-CCNC: 19 U/L (ref 13–60)
LYMPHOCYTES # BLD AUTO: 2.8 10E3/UL (ref 0.8–5.3)
LYMPHOCYTES NFR BLD AUTO: 52 %
MCH RBC QN AUTO: 25.9 PG (ref 26.5–33)
MCHC RBC AUTO-ENTMCNC: 30.7 G/DL (ref 31.5–36.5)
MCV RBC AUTO: 84 FL (ref 78–100)
MONOCYTES # BLD AUTO: 0.5 10E3/UL (ref 0–1.3)
MONOCYTES NFR BLD AUTO: 8 %
NEUTROPHILS # BLD AUTO: 2.2 10E3/UL (ref 1.6–8.3)
NEUTROPHILS NFR BLD AUTO: 39 %
NRBC # BLD AUTO: 0 10E3/UL
NRBC BLD AUTO-RTO: 0 /100
PLATELET # BLD AUTO: 307 10E3/UL (ref 150–450)
POTASSIUM SERPL-SCNC: 3.7 MMOL/L (ref 3.4–5.3)
PROT SERPL-MCNC: 7.6 G/DL (ref 6.4–8.3)
RBC # BLD AUTO: 4.63 10E6/UL (ref 3.8–5.2)
SODIUM SERPL-SCNC: 139 MMOL/L (ref 136–145)
WBC # BLD AUTO: 5.5 10E3/UL (ref 4–11)

## 2023-02-23 PROCEDURE — 250N000013 HC RX MED GY IP 250 OP 250 PS 637: Performed by: EMERGENCY MEDICINE

## 2023-02-23 PROCEDURE — 36415 COLL VENOUS BLD VENIPUNCTURE: CPT | Performed by: EMERGENCY MEDICINE

## 2023-02-23 PROCEDURE — 83690 ASSAY OF LIPASE: CPT | Performed by: EMERGENCY MEDICINE

## 2023-02-23 PROCEDURE — 80053 COMPREHEN METABOLIC PANEL: CPT | Performed by: EMERGENCY MEDICINE

## 2023-02-23 PROCEDURE — 96360 HYDRATION IV INFUSION INIT: CPT

## 2023-02-23 PROCEDURE — 99284 EMERGENCY DEPT VISIT MOD MDM: CPT | Performed by: EMERGENCY MEDICINE

## 2023-02-23 PROCEDURE — 85025 COMPLETE CBC W/AUTO DIFF WBC: CPT | Performed by: EMERGENCY MEDICINE

## 2023-02-23 PROCEDURE — 258N000003 HC RX IP 258 OP 636: Performed by: EMERGENCY MEDICINE

## 2023-02-23 PROCEDURE — 99283 EMERGENCY DEPT VISIT LOW MDM: CPT | Mod: 25

## 2023-02-23 PROCEDURE — 84703 CHORIONIC GONADOTROPIN ASSAY: CPT | Performed by: EMERGENCY MEDICINE

## 2023-02-23 PROCEDURE — 250N000009 HC RX 250: Performed by: EMERGENCY MEDICINE

## 2023-02-23 RX ORDER — SODIUM CHLORIDE 9 MG/ML
INJECTION, SOLUTION INTRAVENOUS CONTINUOUS
Status: DISCONTINUED | OUTPATIENT
Start: 2023-02-23 | End: 2023-02-23 | Stop reason: HOSPADM

## 2023-02-23 RX ORDER — SUCRALFATE 1 G/1
1 TABLET ORAL 4 TIMES DAILY
Qty: 28 TABLET | Refills: 0 | Status: SHIPPED | OUTPATIENT
Start: 2023-02-23 | End: 2023-03-02

## 2023-02-23 RX ADMIN — LIDOCAINE HYDROCHLORIDE 30 ML: 20 SOLUTION ORAL; TOPICAL at 17:32

## 2023-02-23 RX ADMIN — SODIUM CHLORIDE 1000 ML: 9 INJECTION, SOLUTION INTRAVENOUS at 17:19

## 2023-02-23 ASSESSMENT — ACTIVITIES OF DAILY LIVING (ADL)
ADLS_ACUITY_SCORE: 35
ADLS_ACUITY_SCORE: 35

## 2023-02-23 NOTE — ED PROVIDER NOTES
[facility] [place] ([facility])    2/23/23      ED PROVIDER NOTE   ED 20   History     Chief Complaint   Patient presents with     Abdominal Pain     Pt has upper gastric abdominal pain and was seen in urgent care yesterday.  Pt states she feels nauseous.      HPI  Arielle Paul is a 43 year old female who presents with epigastric abdominal pain and nausea.  Patient states symptoms began on 2/18/2023 with no known precipitating factors.  She states she has been having upper abdominal pain as well as nausea and vomiting when trying to eat or thinking of food.  Patient was seen in urgent care yesterday and started on ondansetron.  She states she has had some relief of symptoms with this.  She also notes feeling thirsty.  No other symptoms noted.          PAST MEDICAL HISTORY:   Past Medical History:   Diagnosis Date     Abnormal maternal glucose tolerance, complicating pregnancy, childbirth, or the puerperium, unspecified as to episode of care 2/17/2006    LGA ?.  Refused 3 hr. GTT, to get Diabetic teaching and to start accucheck.     Anemia      Headache(784.0)        PAST SURGICAL HISTORY:   Past Surgical History:   Procedure Laterality Date     NO HISTORY OF SURGERY         Past medical history, past surgical history, medications, and allergies were reviewed with the patient. Additional pertinent items: None    FAMILY HISTORY:   Family History   Problem Relation Age of Onset     Family History Negative No family hx of      Autoimmune Disease No family hx of        SOCIAL HISTORY:   Social History     Tobacco Use     Smoking status: Never     Smokeless tobacco: Never   Substance Use Topics     Alcohol use: No     Social history was reviewed with the patient. Additional pertinent items: None    Patient's Medications   New Prescriptions    No medications on file   Previous Medications    METFORMIN (GLUCOPHAGE) 500 MG TABLET    Start with 1 tab each am times 1 week, then 1 tab in the morning and 1  "in the the evening times 1 week , Then 2 tabs am and 1 tab each evening times 1 week Then finally 2 tabs 2 times a day which is the maintenance dose   Modified Medications    No medications on file   Discontinued Medications    No medications on file          Allergies   Allergen Reactions     Bactrim [Sulfamethoxazole W/Trimethoprim] Rash       A complete review of systems was performed with pertinent positives and negatives noted in the HPI, and all other systems negative.    Physical Exam   BP: (!) 141/91  Pulse: 65  Temp: 98.3  F (36.8  C)  Resp: 16  Height: 162.6 cm (5' 4\")  Weight: 98.9 kg (218 lb)  SpO2: 100 %      Physical Exam  Vitals and nursing note reviewed.   Constitutional:       General: She is not in acute distress.     Appearance: She is well-developed. She is not diaphoretic.   HENT:      Head: Normocephalic and atraumatic.      Mouth/Throat:      Pharynx: No oropharyngeal exudate.   Eyes:      General: No scleral icterus.        Right eye: No discharge.         Left eye: No discharge.      Pupils: Pupils are equal, round, and reactive to light.   Cardiovascular:      Rate and Rhythm: Normal rate and regular rhythm.      Heart sounds: Normal heart sounds. No murmur heard.    No friction rub. No gallop.   Pulmonary:      Effort: Pulmonary effort is normal. No respiratory distress.      Breath sounds: Normal breath sounds. No wheezing.   Chest:      Chest wall: No tenderness.   Abdominal:      General: Bowel sounds are normal. There is no distension.      Palpations: Abdomen is soft.      Tenderness: There is abdominal tenderness in the epigastric area.   Musculoskeletal:         General: No tenderness or deformity. Normal range of motion.      Cervical back: Normal range of motion and neck supple.   Skin:     General: Skin is warm and dry.      Coloration: Skin is not pale.      Findings: No erythema or rash.   Neurological:      Mental Status: She is alert and oriented to person, place, and time. "      Cranial Nerves: No cranial nerve deficit.         ED Course        Procedures                      Labs Ordered and Resulted from Time of ED Arrival to Time of ED Departure - No data to display         Assessments & Plan (with Medical Decision Making)   This is a 43-year-old female who presents with epigastric abdominal pain and nausea/vomiting.  This is has been ongoing for the past several days and has improved somewhat with ondansetron.  On exam she has minimal upper abdominal tenderness.  Lab work shows AST of 55 and ALT of 42.  Patient was given IV fluids and GI cocktail.  On repeat examination patient is feeling improved.  We will start patient on a course of Carafate.  We will discharge with return precautions.    I have reviewed the nursing notes.    I have reviewed the findings, diagnosis, plan and need for follow up with the patient.    New Prescriptions    No medications on file       Final diagnoses:   None     Adalid Mace DO    2/23/2023   [facility] EMERGENCY DEPARTMENT       Adalid Mace, DO  02/24/23 0047

## 2023-02-23 NOTE — ED TRIAGE NOTES
Triage Assessment     Row Name 02/23/23 155       Triage Assessment (Adult)    Airway WDL WDL       Respiratory WDL    Respiratory WDL WDL       Skin Circulation/Temperature WDL    Skin Circulation/Temperature WDL WDL       Cardiac WDL    Cardiac WDL WDL       Peripheral/Neurovascular WDL    Peripheral Neurovascular WDL WDL       Cognitive/Neuro/Behavioral WDL    Cognitive/Neuro/Behavioral WDL WDL

## 2023-02-26 ENCOUNTER — APPOINTMENT (OUTPATIENT)
Dept: ULTRASOUND IMAGING | Facility: CLINIC | Age: 43
End: 2023-02-26
Attending: EMERGENCY MEDICINE
Payer: COMMERCIAL

## 2023-02-26 ENCOUNTER — HOSPITAL ENCOUNTER (EMERGENCY)
Facility: CLINIC | Age: 43
Discharge: HOME | End: 2023-02-26
Attending: EMERGENCY MEDICINE | Admitting: EMERGENCY MEDICINE
Payer: COMMERCIAL

## 2023-02-26 ENCOUNTER — APPOINTMENT (OUTPATIENT)
Dept: GENERAL RADIOLOGY | Facility: CLINIC | Age: 43
End: 2023-02-26
Attending: EMERGENCY MEDICINE
Payer: COMMERCIAL

## 2023-02-26 VITALS
HEART RATE: 63 BPM | RESPIRATION RATE: 12 BRPM | TEMPERATURE: 98.4 F | OXYGEN SATURATION: 100 % | SYSTOLIC BLOOD PRESSURE: 121 MMHG | DIASTOLIC BLOOD PRESSURE: 81 MMHG | BODY MASS INDEX: 37.63 KG/M2 | WEIGHT: 219.2 LBS

## 2023-02-26 DIAGNOSIS — R11.2 NAUSEA AND VOMITING, UNSPECIFIED VOMITING TYPE: ICD-10-CM

## 2023-02-26 DIAGNOSIS — R10.13 EPIGASTRIC PAIN: ICD-10-CM

## 2023-02-26 DIAGNOSIS — K76.0 HEPATIC STEATOSIS: ICD-10-CM

## 2023-02-26 LAB
ALBUMIN SERPL BCG-MCNC: 3.8 G/DL (ref 3.5–5.2)
ALBUMIN UR-MCNC: NEGATIVE MG/DL
ALP SERPL-CCNC: 53 U/L (ref 35–104)
ALT SERPL W P-5'-P-CCNC: 35 U/L (ref 10–35)
ANION GAP SERPL CALCULATED.3IONS-SCNC: 12 MMOL/L (ref 7–15)
APPEARANCE UR: CLEAR
AST SERPL W P-5'-P-CCNC: 37 U/L (ref 10–35)
BACTERIA #/AREA URNS HPF: ABNORMAL /HPF
BASOPHILS # BLD AUTO: 0 10E3/UL (ref 0–0.2)
BASOPHILS NFR BLD AUTO: 0 %
BILIRUB SERPL-MCNC: 1 MG/DL
BILIRUB UR QL STRIP: ABNORMAL
BUN SERPL-MCNC: 9.1 MG/DL (ref 6–20)
CALCIUM SERPL-MCNC: 9.3 MG/DL (ref 8.6–10)
CHLORIDE SERPL-SCNC: 99 MMOL/L (ref 98–107)
COLOR UR AUTO: YELLOW
CREAT SERPL-MCNC: 0.73 MG/DL (ref 0.51–0.95)
DEPRECATED HCO3 PLAS-SCNC: 25 MMOL/L (ref 22–29)
EOSINOPHIL # BLD AUTO: 0.1 10E3/UL (ref 0–0.7)
EOSINOPHIL NFR BLD AUTO: 2 %
ERYTHROCYTE [DISTWIDTH] IN BLOOD BY AUTOMATED COUNT: 13.1 % (ref 10–15)
GFR SERPL CREATININE-BSD FRML MDRD: >90 ML/MIN/1.73M2
GLUCOSE SERPL-MCNC: 176 MG/DL (ref 70–99)
GLUCOSE UR STRIP-MCNC: NEGATIVE MG/DL
HCT VFR BLD AUTO: 35.7 % (ref 35–47)
HGB BLD-MCNC: 11.3 G/DL (ref 11.7–15.7)
HGB UR QL STRIP: NEGATIVE
IMM GRANULOCYTES # BLD: 0 10E3/UL
IMM GRANULOCYTES NFR BLD: 0 %
KETONES UR STRIP-MCNC: 15 MG/DL
LEUKOCYTE ESTERASE UR QL STRIP: NEGATIVE
LIPASE SERPL-CCNC: 27 U/L (ref 13–60)
LYMPHOCYTES # BLD AUTO: 2.1 10E3/UL (ref 0.8–5.3)
LYMPHOCYTES NFR BLD AUTO: 43 %
MCH RBC QN AUTO: 26.5 PG (ref 26.5–33)
MCHC RBC AUTO-ENTMCNC: 31.7 G/DL (ref 31.5–36.5)
MCV RBC AUTO: 84 FL (ref 78–100)
MONOCYTES # BLD AUTO: 0.4 10E3/UL (ref 0–1.3)
MONOCYTES NFR BLD AUTO: 9 %
MUCOUS THREADS #/AREA URNS LPF: PRESENT /LPF
NEUTROPHILS # BLD AUTO: 2.2 10E3/UL (ref 1.6–8.3)
NEUTROPHILS NFR BLD AUTO: 46 %
NITRATE UR QL: NEGATIVE
NRBC # BLD AUTO: 0 10E3/UL
NRBC BLD AUTO-RTO: 0 /100
PH UR STRIP: 5.5 [PH] (ref 5–7)
PLATELET # BLD AUTO: 259 10E3/UL (ref 150–450)
POTASSIUM SERPL-SCNC: 3.8 MMOL/L (ref 3.4–5.3)
PROT SERPL-MCNC: 6.8 G/DL (ref 6.4–8.3)
RBC # BLD AUTO: 4.27 10E6/UL (ref 3.8–5.2)
RBC URINE: <1 /HPF
SODIUM SERPL-SCNC: 136 MMOL/L (ref 136–145)
SP GR UR STRIP: >=1.03 (ref 1–1.03)
SQUAMOUS EPITHELIAL: 14 /HPF
UROBILINOGEN UR STRIP-MCNC: 0.2 MG/DL
WBC # BLD AUTO: 4.8 10E3/UL (ref 4–11)
WBC URINE: 3 /HPF

## 2023-02-26 PROCEDURE — 250N000011 HC RX IP 250 OP 636: Performed by: EMERGENCY MEDICINE

## 2023-02-26 PROCEDURE — 99284 EMERGENCY DEPT VISIT MOD MDM: CPT | Performed by: EMERGENCY MEDICINE

## 2023-02-26 PROCEDURE — 80053 COMPREHEN METABOLIC PANEL: CPT | Performed by: EMERGENCY MEDICINE

## 2023-02-26 PROCEDURE — 71046 X-RAY EXAM CHEST 2 VIEWS: CPT

## 2023-02-26 PROCEDURE — 81001 URINALYSIS AUTO W/SCOPE: CPT | Performed by: EMERGENCY MEDICINE

## 2023-02-26 PROCEDURE — 36415 COLL VENOUS BLD VENIPUNCTURE: CPT | Performed by: EMERGENCY MEDICINE

## 2023-02-26 PROCEDURE — 96374 THER/PROPH/DIAG INJ IV PUSH: CPT

## 2023-02-26 PROCEDURE — 76705 ECHO EXAM OF ABDOMEN: CPT

## 2023-02-26 PROCEDURE — 85025 COMPLETE CBC W/AUTO DIFF WBC: CPT | Performed by: EMERGENCY MEDICINE

## 2023-02-26 PROCEDURE — 96361 HYDRATE IV INFUSION ADD-ON: CPT

## 2023-02-26 PROCEDURE — 250N000013 HC RX MED GY IP 250 OP 250 PS 637: Performed by: EMERGENCY MEDICINE

## 2023-02-26 PROCEDURE — 99285 EMERGENCY DEPT VISIT HI MDM: CPT | Mod: 25

## 2023-02-26 PROCEDURE — 258N000003 HC RX IP 258 OP 636: Performed by: EMERGENCY MEDICINE

## 2023-02-26 PROCEDURE — 83690 ASSAY OF LIPASE: CPT | Performed by: EMERGENCY MEDICINE

## 2023-02-26 RX ORDER — IBUPROFEN 600 MG/1
600 TABLET, FILM COATED ORAL ONCE
Status: COMPLETED | OUTPATIENT
Start: 2023-02-26 | End: 2023-02-26

## 2023-02-26 RX ORDER — ONDANSETRON 2 MG/ML
4 INJECTION INTRAMUSCULAR; INTRAVENOUS EVERY 30 MIN PRN
Status: DISCONTINUED | OUTPATIENT
Start: 2023-02-26 | End: 2023-02-26 | Stop reason: HOSPADM

## 2023-02-26 RX ORDER — ACETAMINOPHEN 325 MG/1
975 TABLET ORAL ONCE
Status: COMPLETED | OUTPATIENT
Start: 2023-02-26 | End: 2023-02-26

## 2023-02-26 RX ORDER — SODIUM CHLORIDE 9 MG/ML
INJECTION, SOLUTION INTRAVENOUS CONTINUOUS
Status: DISCONTINUED | OUTPATIENT
Start: 2023-02-26 | End: 2023-02-26 | Stop reason: HOSPADM

## 2023-02-26 RX ORDER — ONDANSETRON 4 MG/1
4 TABLET, ORALLY DISINTEGRATING ORAL EVERY 8 HOURS PRN
Qty: 10 TABLET | Refills: 0 | Status: SHIPPED | OUTPATIENT
Start: 2023-02-26 | End: 2023-02-27

## 2023-02-26 RX ORDER — MAGNESIUM HYDROXIDE/ALUMINUM HYDROXICE/SIMETHICONE 120; 1200; 1200 MG/30ML; MG/30ML; MG/30ML
30 SUSPENSION ORAL ONCE
Status: COMPLETED | OUTPATIENT
Start: 2023-02-26 | End: 2023-02-26

## 2023-02-26 RX ADMIN — ONDANSETRON 4 MG: 2 INJECTION INTRAMUSCULAR; INTRAVENOUS at 09:00

## 2023-02-26 RX ADMIN — SODIUM CHLORIDE 1000 ML: 9 INJECTION, SOLUTION INTRAVENOUS at 08:59

## 2023-02-26 RX ADMIN — ACETAMINOPHEN 975 MG: 325 TABLET ORAL at 10:27

## 2023-02-26 RX ADMIN — SODIUM CHLORIDE: 9 INJECTION, SOLUTION INTRAVENOUS at 10:18

## 2023-02-26 ASSESSMENT — ACTIVITIES OF DAILY LIVING (ADL)
ADLS_ACUITY_SCORE: 35
ADLS_ACUITY_SCORE: 35

## 2023-02-26 NOTE — DISCHARGE INSTRUCTIONS
Your tests are normal except for fatty infiltration of your liver seen on the ultrasound, there is no evidence for gallstones or gallbladder disease  Follow-up in your primary care clinic for H. pylori testing  Use Tylenol and ibuprofen for pain, Zofran for nausea and vomiting

## 2023-02-26 NOTE — ED PROVIDER NOTES
ED Provider Note  [facility]      History     Chief Complaint   Patient presents with     Chest Wall Pain     Pt reports she was here two days ago for epigastric pain and now the pain is under her left breast. No appetite.     HPI  Arielle Paul is a 43 year old female who denies past medical history comes in 2 days after being evaluated here.  She complains of epigastric discomfort nausea vomiting inability to tolerate p.o. including liquids.  Says she is not pregnant does not have a history of gallbladder disease no hematemesis does not have peptic ulcer disease she does have a primary clinic that she goes to however she is not sure whether she has been checked for H. pylori.  She was given a medication that she has not been taken at her last visit.  The pain was in the epigastrium and now she says it radiates more to her left anterior chest below the left breast.    Past Medical History  Past Medical History:   Diagnosis Date     Abnormal maternal glucose tolerance, complicating pregnancy, childbirth, or the puerperium, unspecified as to episode of care 2/17/2006    LGA ?.  Refused 3 hr. GTT, to get Diabetic teaching and to start accucheck.     Anemia      Headache(784.0)      Past Surgical History:   Procedure Laterality Date     NO HISTORY OF SURGERY       metFORMIN (GLUCOPHAGE) 500 MG tablet  ondansetron (ZOFRAN ODT) 4 MG ODT tab  sucralfate (CARAFATE) 1 GM tablet      Allergies   Allergen Reactions     Bactrim [Sulfamethoxazole W/Trimethoprim] Rash     Family History  Family History   Problem Relation Age of Onset     Family History Negative No family hx of      Autoimmune Disease No family hx of      Social History   Social History     Tobacco Use     Smoking status: Never     Smokeless tobacco: Never   Substance Use Topics     Alcohol use: No     Drug use: No         A medically appropriate review of systems was performed with pertinent positives and negatives noted in the HPI, and  all other systems negative.    Physical Exam   BP: 121/81  Pulse: 63  Temp: 98.4  F (36.9  C)  Resp: 12  Weight: 99.4 kg (219 lb 3.2 oz)  SpO2: 100 %  Physical Exam  Vitals and nursing note reviewed.   Constitutional:       General: She is not in acute distress.     Appearance: She is well-developed.   HENT:      Head: Normocephalic and atraumatic.      Mouth/Throat:      Mouth: Mucous membranes are moist.   Eyes:      General: No scleral icterus.     Conjunctiva/sclera: Conjunctivae normal.      Pupils: Pupils are equal, round, and reactive to light.   Cardiovascular:      Rate and Rhythm: Normal rate and regular rhythm.      Heart sounds: Normal heart sounds.   Pulmonary:      Effort: Pulmonary effort is normal. No respiratory distress.      Breath sounds: Normal breath sounds. No wheezing.   Abdominal:      General: Abdomen is flat.      Palpations: Abdomen is soft.   Musculoskeletal:      Cervical back: Neck supple.   Skin:     General: Skin is warm and dry.   Neurological:      General: No focal deficit present.      Mental Status: She is alert and oriented to person, place, and time.      Cranial Nerves: No cranial nerve deficit.   Psychiatric:         Mood and Affect: Mood normal.         Behavior: Behavior normal.           ED Course, Procedures, & Data      Procedures                Results for orders placed or performed during the hospital encounter of 02/26/23   US Abdomen Limited (RUQ)     Status: None    Narrative    EXAM: US ABDOMEN LIMITED  LOCATION: [facility]  DATE/TIME: 2/26/2023 9:51 AM    INDICATION: Pain, nausea and vomiting.  COMPARISON: None.  TECHNIQUE: Limited abdominal ultrasound.    FINDINGS:    GALLBLADDER: Normal. No gallstones, wall thickening, or pericholecystic fluid. Negative sonographic Fields's sign.    BILE DUCTS: No biliary dilatation. The common duct measures 4 mm.    LIVER: Increased echogenicity from diffuse fatty infiltration. No  focal mass.    RIGHT KIDNEY: No hydronephrosis.    PANCREAS: The visualized portions are normal.    No ascites.      Impression    IMPRESSION:    1.  No gallstones or biliary ductal dilation.    2.  Diffuse hepatic steatosis.   XR Chest 2 Views     Status: None    Narrative    EXAM: XR CHEST 2 VIEWS  LOCATION: [facility]  DATE/TIME: 2/26/2023 10:58 AM    INDICATION: Left inferior chest pain. Assess for infiltrate.  COMPARISON: 11/20/2019      Impression    IMPRESSION: Lungs clear.  Pulmonary vascularity normal.  No effusion.    CONCLUSION: Negative chest.   Comprehensive metabolic panel     Status: Abnormal   Result Value Ref Range    Sodium 136 136 - 145 mmol/L    Potassium 3.8 3.4 - 5.3 mmol/L    Chloride 99 98 - 107 mmol/L    Carbon Dioxide (CO2) 25 22 - 29 mmol/L    Anion Gap 12 7 - 15 mmol/L    Urea Nitrogen 9.1 6.0 - 20.0 mg/dL    Creatinine 0.73 0.51 - 0.95 mg/dL    Calcium 9.3 8.6 - 10.0 mg/dL    Glucose 176 (H) 70 - 99 mg/dL    Alkaline Phosphatase 53 35 - 104 U/L    AST 37 (H) 10 - 35 U/L    ALT 35 10 - 35 U/L    Protein Total 6.8 6.4 - 8.3 g/dL    Albumin 3.8 3.5 - 5.2 g/dL    Bilirubin Total 1.0 <=1.2 mg/dL    GFR Estimate >90 >60 mL/min/1.73m2   Lipase     Status: Normal   Result Value Ref Range    Lipase 27 13 - 60 U/L   UA with Microscopic reflex to Culture     Status: Abnormal    Specimen: Urine, Midstream   Result Value Ref Range    Color Urine Yellow Colorless, Straw, Light Yellow, Yellow    Appearance Urine Clear Clear    Glucose Urine Negative Negative mg/dL    Bilirubin Urine Small (A) Negative    Ketones Urine 15 (A) Negative mg/dL    Specific Gravity Urine >=1.030 1.003 - 1.035    Blood Urine Negative Negative    pH Urine 5.5 5.0 - 7.0    Protein Albumin Urine Negative Negative mg/dL    Urobilinogen Urine 0.2 0.2, 1.0 mg/dL    Nitrite Urine Negative Negative    Leukocyte Esterase Urine Negative Negative    Bacteria Urine Few (A) None Seen /HPF     Mucus Urine Present (A) None Seen /LPF    RBC Urine <1 <=2 /HPF    WBC Urine 3 <=5 /HPF    Squamous Epithelials Urine 14 (H) <=1 /HPF    Narrative    Urine Culture not indicated   CBC with platelets and differential     Status: Abnormal   Result Value Ref Range    WBC Count 4.8 4.0 - 11.0 10e3/uL    RBC Count 4.27 3.80 - 5.20 10e6/uL    Hemoglobin 11.3 (L) 11.7 - 15.7 g/dL    Hematocrit 35.7 35.0 - 47.0 %    MCV 84 78 - 100 fL    MCH 26.5 26.5 - 33.0 pg    MCHC 31.7 31.5 - 36.5 g/dL    RDW 13.1 10.0 - 15.0 %    Platelet Count 259 150 - 450 10e3/uL    % Neutrophils 46 %    % Lymphocytes 43 %    % Monocytes 9 %    % Eosinophils 2 %    % Basophils 0 %    % Immature Granulocytes 0 %    NRBCs per 100 WBC 0 <1 /100    Absolute Neutrophils 2.2 1.6 - 8.3 10e3/uL    Absolute Lymphocytes 2.1 0.8 - 5.3 10e3/uL    Absolute Monocytes 0.4 0.0 - 1.3 10e3/uL    Absolute Eosinophils 0.1 0.0 - 0.7 10e3/uL    Absolute Basophils 0.0 0.0 - 0.2 10e3/uL    Absolute Immature Granulocytes 0.0 <=0.4 10e3/uL    Absolute NRBCs 0.0 10e3/uL   CBC with platelets differential     Status: Abnormal    Narrative    The following orders were created for panel order CBC with platelets differential.  Procedure                               Abnormality         Status                     ---------                               -----------         ------                     CBC with platelets and d...[[id number]]  Abnormal            Final result                 Please view results for these tests on the individual orders.     Medications   0.9% sodium chloride BOLUS (0 mLs Intravenous Stopped 2/26/23 1019)     Followed by   sodium chloride 0.9% infusion (0 mLs Intravenous Stopped 2/26/23 1155)   ondansetron (ZOFRAN) injection 4 mg (4 mg Intravenous $Given 2/26/23 0900)   alum & mag hydroxide-simethicone (MAALOX) suspension 30 mL (has no administration in time range)   acetaminophen (TYLENOL) tablet 975 mg (975 mg Oral $Given 2/26/23 1027)   ibuprofen  (ADVIL/MOTRIN) tablet 600 mg (600 mg Oral Not Given 2/26/23 1029)     Labs Ordered and Resulted from Time of ED Arrival to Time of ED Departure   COMPREHENSIVE METABOLIC PANEL - Abnormal       Result Value    Sodium 136      Potassium 3.8      Chloride 99      Carbon Dioxide (CO2) 25      Anion Gap 12      Urea Nitrogen 9.1      Creatinine 0.73      Calcium 9.3      Glucose 176 (*)     Alkaline Phosphatase 53      AST 37 (*)     ALT 35      Protein Total 6.8      Albumin 3.8      Bilirubin Total 1.0      GFR Estimate >90     ROUTINE UA WITH MICROSCOPIC REFLEX TO CULTURE - Abnormal    Color Urine Yellow      Appearance Urine Clear      Glucose Urine Negative      Bilirubin Urine Small (*)     Ketones Urine 15 (*)     Specific Gravity Urine >=1.030      Blood Urine Negative      pH Urine 5.5      Protein Albumin Urine Negative      Urobilinogen Urine 0.2      Nitrite Urine Negative      Leukocyte Esterase Urine Negative      Bacteria Urine Few (*)     Mucus Urine Present (*)     RBC Urine <1      WBC Urine 3      Squamous Epithelials Urine 14 (*)    CBC WITH PLATELETS AND DIFFERENTIAL - Abnormal    WBC Count 4.8      RBC Count 4.27      Hemoglobin 11.3 (*)     Hematocrit 35.7      MCV 84      MCH 26.5      MCHC 31.7      RDW 13.1      Platelet Count 259      % Neutrophils 46      % Lymphocytes 43      % Monocytes 9      % Eosinophils 2      % Basophils 0      % Immature Granulocytes 0      NRBCs per 100 WBC 0      Absolute Neutrophils 2.2      Absolute Lymphocytes 2.1      Absolute Monocytes 0.4      Absolute Eosinophils 0.1      Absolute Basophils 0.0      Absolute Immature Granulocytes 0.0      Absolute NRBCs 0.0     LIPASE - Normal    Lipase 27       XR Chest 2 Views   Final Result   IMPRESSION: Lungs clear.  Pulmonary vascularity normal.  No effusion.      CONCLUSION: Negative chest.      US Abdomen Limited (RUQ)   Final Result   IMPRESSION:      1.  No gallstones or biliary ductal dilation.      2.  Diffuse  hepatic steatosis.           XR Chest 2 Views   Final Result   IMPRESSION: Lungs clear.  Pulmonary vascularity normal.  No effusion.      CONCLUSION: Negative chest.      US Abdomen Limited (RUQ)   Final Result   IMPRESSION:      1.  No gallstones or biliary ductal dilation.      2.  Diffuse hepatic steatosis.              Medical Decision Making  The patient's presentation was of low complexity (an acute and uncomplicated illness or injury).    The patient's evaluation involved:  ordering and/or review of 3+ test(s) in this encounter (CBC, metabolic panel, lipase, chest x-ray, right upper quadrant ultrasound)    The patient's management necessitated only low risk treatment.      Assessment & Plan    43-year-old female with a significant past medical history comes in for repeat evaluation after being seen here 2 days ago having a negative work-up and being sent home.  She comes in with nausea vomiting epigastric pain since she cannot tolerate drinking any liquids.  Her labs were repeated and are normal she was given IV fluids and Zofran she was able to take p.o. here.  She had a slight elevation of her LFTs the previous visit so I did a right upper quadrant ultrasound that shows fatty liver but no evidence for Beryl cystitis or cholelithiasis.  Recommend she follow-up with her primary care provider for H. pylori testing will discharge discharge her with a prescription for Zofran and ODT.    I have reviewed the nursing notes. I have reviewed the findings, diagnosis, plan and need for follow up with the patient.    New Prescriptions    ONDANSETRON (ZOFRAN ODT) 4 MG ODT TAB    Take 1 tablet (4 mg) by mouth every 8 hours as needed for nausea or vomiting       Final diagnoses:   Nausea and vomiting, unspecified vomiting type   Epigastric pain   Hepatic steatosis       Luis Saldivar MD  [facility] EMERGENCY DEPARTMENT  2/26/2023     Luis Saldivar MD  02/26/23 1768

## 2023-02-27 ENCOUNTER — TELEPHONE (OUTPATIENT)
Dept: FAMILY MEDICINE | Facility: CLINIC | Age: 43
End: 2023-02-27

## 2023-02-27 ENCOUNTER — OFFICE VISIT (OUTPATIENT)
Dept: FAMILY MEDICINE | Facility: CLINIC | Age: 43
End: 2023-02-27
Payer: COMMERCIAL

## 2023-02-27 VITALS
BODY MASS INDEX: 38.21 KG/M2 | DIASTOLIC BLOOD PRESSURE: 66 MMHG | OXYGEN SATURATION: 100 % | SYSTOLIC BLOOD PRESSURE: 139 MMHG | WEIGHT: 222.6 LBS | HEART RATE: 61 BPM | TEMPERATURE: 98.2 F

## 2023-02-27 DIAGNOSIS — E55.9 VITAMIN D DEFICIENCY: ICD-10-CM

## 2023-02-27 DIAGNOSIS — K29.50 CHRONIC GASTRITIS WITHOUT BLEEDING, UNSPECIFIED GASTRITIS TYPE: Primary | ICD-10-CM

## 2023-02-27 DIAGNOSIS — E11.9 TYPE 2 DIABETES MELLITUS WITHOUT COMPLICATION, UNSPECIFIED WHETHER LONG TERM INSULIN USE (H): ICD-10-CM

## 2023-02-27 PROBLEM — H74.392: Status: ACTIVE | Noted: 2018-01-26

## 2023-02-27 LAB — HBA1C MFR BLD: 8.4 % (ref 0–5.6)

## 2023-02-27 PROCEDURE — 82306 VITAMIN D 25 HYDROXY: CPT | Performed by: FAMILY MEDICINE

## 2023-02-27 PROCEDURE — 99215 OFFICE O/P EST HI 40 MIN: CPT | Performed by: FAMILY MEDICINE

## 2023-02-27 PROCEDURE — 36415 COLL VENOUS BLD VENIPUNCTURE: CPT | Performed by: FAMILY MEDICINE

## 2023-02-27 PROCEDURE — 83036 HEMOGLOBIN GLYCOSYLATED A1C: CPT | Performed by: FAMILY MEDICINE

## 2023-02-27 RX ORDER — CALCIUM CARBONATE 500 MG/1
1 TABLET, CHEWABLE ORAL 2 TIMES DAILY
Qty: 90 TABLET | Refills: 1 | Status: SHIPPED | OUTPATIENT
Start: 2023-02-27 | End: 2023-02-27

## 2023-02-27 RX ORDER — LANCETS
EACH MISCELLANEOUS
Qty: 100 EACH | Refills: 6 | Status: SHIPPED | OUTPATIENT
Start: 2023-02-27 | End: 2023-02-27

## 2023-02-27 RX ORDER — ONDANSETRON 4 MG/1
4 TABLET, ORALLY DISINTEGRATING ORAL EVERY 8 HOURS PRN
Qty: 10 TABLET | Refills: 3 | Status: SHIPPED | OUTPATIENT
Start: 2023-02-27

## 2023-02-27 RX ORDER — ONDANSETRON 4 MG/1
4 TABLET, ORALLY DISINTEGRATING ORAL EVERY 8 HOURS PRN
Qty: 10 TABLET | Refills: 3 | Status: SHIPPED | OUTPATIENT
Start: 2023-02-27 | End: 2023-02-27

## 2023-02-27 RX ORDER — GLUCOSAMINE HCL/CHONDROITIN SU 500-400 MG
CAPSULE ORAL
Qty: 100 EACH | Refills: 3 | Status: SHIPPED | OUTPATIENT
Start: 2023-02-27 | End: 2023-02-27

## 2023-02-27 RX ORDER — GLUCOSAMINE HCL/CHONDROITIN SU 500-400 MG
CAPSULE ORAL
Qty: 100 EACH | Refills: 3 | Status: SHIPPED | OUTPATIENT
Start: 2023-02-27

## 2023-02-27 RX ORDER — LANCETS
EACH MISCELLANEOUS
Qty: 100 EACH | Refills: 6 | Status: SHIPPED | OUTPATIENT
Start: 2023-02-27

## 2023-02-27 RX ORDER — CALCIUM CARBONATE 500 MG/1
1 TABLET, CHEWABLE ORAL 2 TIMES DAILY
Qty: 90 TABLET | Refills: 1 | Status: SHIPPED | OUTPATIENT
Start: 2023-02-27 | End: 2023-05-10

## 2023-02-27 NOTE — TELEPHONE ENCOUNTER
"Patient calling stating that she had been to ED 2x in last week due to nausea and vomiting, discharged and told to follow-up with primary care. Patient reports that she is still having nausea and is unable to eat, did received hydration in ED. Per chart review, ED note states the following \"Recommend she follow-up with her primary care provider for H. pylori testing will discharge discharge her with a prescription for Zofran and ODT\". Patient continuing to have stomach pain/nausea and is unable to eat, requested appointment today. Appointment made for 02/27/23.    SHADIA OvertonN RN  [facility]    "

## 2023-02-27 NOTE — PROGRESS NOTES
"  Assessment & Plan       ICD-10-CM    1. Chronic gastritis without bleeding, unspecified gastritis type  K29.50 omeprazole (PRILOSEC) 20 MG DR capsule     ondansetron (ZOFRAN ODT) 4 MG ODT tab     calcium carbonate (TUMS) 500 MG chewable tablet      2. Type 2 diabetes mellitus without complication, unspecified whether long term insulin use (H)  E11.9 Hemoglobin A1c     blood glucose monitoring (NO BRAND SPECIFIED) meter device kit     blood glucose (NO BRAND SPECIFIED) test strip     blood glucose calibration (NO BRAND SPECIFIED) solution     thin (NO BRAND SPECIFIED) lancets     alcohol swab prep pads     AMB Adult Diabetes Educator Referral     Hemoglobin A1c      3. Vitamin D deficiency  E55.9 Vitamin D Deficiency     Vitamin D Deficiency      doesn't want  however seems to have some difficulty with plan and teach-back.  I needed to explain several times.    Review of external notes as documented elsewhere in note  I spent a total of 49 minutes on the day of the visit.   Time spent doing chart review, history and exam, documentation and further activities per the note     MED REC REQUIRED  Post Medication Reconciliation Status:       BMI:   Estimated body mass index is 38.21 kg/m  as calculated from the following:    Height as of 2/23/23: 1.626 m (5' 4\").    Weight as of this encounter: 101 kg (222 lb 9.6 oz).   Weight management plan: Discussed healthy diet and exercise guidelines    Patient Instructions   Arielle     Your symptoms are most likely due to acid reflux.  I recommend taking prilosec once daily for 2 weeks along with tums.  If needed, take zofran (nausea medication) to make prilosec easier to take.  Follow-up in 2 weeks.    Sushil Forde MD       Return in about 2 weeks (around 3/13/2023).    Sushil Forde MD  [facility] KRANTHI Guzmán is a 43 year old, presenting for the following health issues:  Hospital F/U      HPI     ED/UC Followup:    Facility:  " [facility]  Date of visit: 02/23/2023 & 02/26/2023  Reason for visit: Abdominal pain and nausea/vomiting  Current Status: Still feeling nauseous         Nausea  Vomiting x 1 week  ER - ultrasound and xray - normal.    Was given zofran    ER x 2 for nausea, vomiting, abdominal pain  zofran helps with nausea  Mid-upper abdominal pain associated with eating  Requests h pylori testing  Has not treat antacid treatment    History of iron deficiency anemia  Not taking iron currently    History of diabetes  She is unaware of this  Not taking metformin    Review of Systems   Constitutional, HEENT, cardiovascular, pulmonary, gi and gu systems are negative, except as otherwise noted.      Objective    /66   Pulse 61   Temp 98.2  F (36.8  C) (Oral)   Wt 101 kg (222 lb 9.6 oz)   LMP 02/16/2023 (Approximate)   SpO2 100%   BMI 38.21 kg/m    Body mass index is 38.21 kg/m .  Physical Exam  Vitals reviewed.   Constitutional:       General: She is not in acute distress.     Appearance: Normal appearance. She is well-developed.   HENT:      Head: Normocephalic and atraumatic.      Right Ear: External ear normal.      Left Ear: External ear normal.      Nose: Nose normal.   Eyes:      General: No scleral icterus.     Extraocular Movements: Extraocular movements intact.      Conjunctiva/sclera: Conjunctivae normal.   Cardiovascular:      Rate and Rhythm: Normal rate.   Pulmonary:      Effort: Pulmonary effort is normal.   Musculoskeletal:         General: No deformity. Normal range of motion.      Cervical back: Normal range of motion.   Skin:     General: Skin is warm and dry.      Findings: No rash.   Neurological:      Mental Status: She is alert and oriented to person, place, and time. Mental status is at baseline.      Gait: Gait normal.   Psychiatric:         Behavior: Behavior normal.         Thought Content: Thought content normal.         Judgment: Judgment normal.

## 2023-02-27 NOTE — PATIENT INSTRUCTIONS
Arielle     Your symptoms are most likely due to acid reflux.  I recommend taking prilosec once daily for 2 weeks along with tums.  If needed, take zofran (nausea medication) to make prilosec easier to take.  Follow-up in 2 weeks.    Sushil Forde MD

## 2023-02-28 ENCOUNTER — TELEPHONE (OUTPATIENT)
Dept: FAMILY MEDICINE | Facility: CLINIC | Age: 43
End: 2023-02-28
Payer: COMMERCIAL

## 2023-02-28 DIAGNOSIS — E11.9 TYPE 2 DIABETES MELLITUS WITHOUT COMPLICATION, UNSPECIFIED WHETHER LONG TERM INSULIN USE (H): ICD-10-CM

## 2023-02-28 LAB — DEPRECATED CALCIDIOL+CALCIFEROL SERPL-MC: 41 UG/L (ref 20–75)

## 2023-02-28 NOTE — TELEPHONE ENCOUNTER
"Called patient with [facility] ; no answer. Left VM to return call to [phone].    Please give PCP result message:    \"Your diabetes has worsened over the past 6 months as your A1c is up to 8.4%.  I recommend starting your metformin and follow-up in the next few weeks to discuss further.\"    Kiley Ramirez RN  [facility]    "

## 2023-02-28 NOTE — TELEPHONE ENCOUNTER
Patient needing new rx for metformin; cued.     Patient is also requesting vitamin D supplement; informed that her Vitamin D level is normal without current supplementation but patient would still like to request a vitamin D prescription.    Kiley Ramirez RN  [facility]

## 2023-03-01 ENCOUNTER — TELEPHONE (OUTPATIENT)
Dept: FAMILY MEDICINE | Facility: CLINIC | Age: 43
End: 2023-03-01
Payer: COMMERCIAL

## 2023-03-01 NOTE — TELEPHONE ENCOUNTER
Nutrition Education Scheduling Outreach #1:    Call to patient to schedule. Left message with phone number to call to schedule.    Plan for 2nd outreach attempt within 3 business days.    [facility]  Diabetes and Nutrition Scheduling

## 2023-03-06 NOTE — TELEPHONE ENCOUNTER
Diabetes Education Scheduling Outreach #2:    Call to patient to schedule. Left message with phone number to call to schedule.    Sandra Sanchez  [place] [facility]  Diabetes and Nutrition Scheduling

## 2023-05-10 ENCOUNTER — OFFICE VISIT (OUTPATIENT)
Dept: FAMILY MEDICINE | Facility: CLINIC | Age: 43
End: 2023-05-10
Payer: COMMERCIAL

## 2023-05-10 VITALS
SYSTOLIC BLOOD PRESSURE: 108 MMHG | HEART RATE: 70 BPM | TEMPERATURE: 98.1 F | WEIGHT: 215.8 LBS | BODY MASS INDEX: 36.84 KG/M2 | OXYGEN SATURATION: 100 % | DIASTOLIC BLOOD PRESSURE: 79 MMHG | HEIGHT: 64 IN

## 2023-05-10 DIAGNOSIS — E66.01 MORBID OBESITY (H): ICD-10-CM

## 2023-05-10 DIAGNOSIS — J30.2 SEASONAL ALLERGIC RHINITIS, UNSPECIFIED TRIGGER: ICD-10-CM

## 2023-05-10 DIAGNOSIS — E11.65 TYPE 2 DIABETES MELLITUS WITH HYPERGLYCEMIA, WITHOUT LONG-TERM CURRENT USE OF INSULIN (H): ICD-10-CM

## 2023-05-10 DIAGNOSIS — R09.81 NASAL CONGESTION: Primary | ICD-10-CM

## 2023-05-10 DIAGNOSIS — K29.50 CHRONIC GASTRITIS WITHOUT BLEEDING, UNSPECIFIED GASTRITIS TYPE: ICD-10-CM

## 2023-05-10 DIAGNOSIS — E55.9 VITAMIN D DEFICIENCY: ICD-10-CM

## 2023-05-10 PROCEDURE — 99214 OFFICE O/P EST MOD 30 MIN: CPT | Performed by: PHYSICIAN ASSISTANT

## 2023-05-10 RX ORDER — CALCIUM CARBONATE 500 MG/1
1 TABLET, CHEWABLE ORAL 2 TIMES DAILY
Qty: 90 TABLET | Refills: 1 | Status: SHIPPED | OUTPATIENT
Start: 2023-05-10

## 2023-05-10 RX ORDER — CHOLECALCIFEROL (VITAMIN D3) 50 MCG
1 TABLET ORAL DAILY
Qty: 90 TABLET | Refills: 3 | Status: SHIPPED | OUTPATIENT
Start: 2023-05-10

## 2023-05-10 RX ORDER — FLUTICASONE PROPIONATE 50 MCG
1 SPRAY, SUSPENSION (ML) NASAL DAILY
Qty: 16 G | Refills: 1 | Status: SHIPPED | OUTPATIENT
Start: 2023-05-10

## 2023-05-10 RX ORDER — CETIRIZINE HYDROCHLORIDE 10 MG/1
10 TABLET ORAL DAILY
Qty: 90 TABLET | Refills: 1 | Status: SHIPPED | OUTPATIENT
Start: 2023-05-10

## 2023-05-10 NOTE — PROGRESS NOTES
Assessment & Plan     Type 2 diabetes mellitus with hyperglycemia, without long-term current use of insulin (H)  Needs follow up.  Encouraged her to schedule follow up in a month      Chronic gastritis without bleeding, unspecified gastritis type  refilled  - calcium carbonate (TUMS) 500 MG chewable tablet; Take 1 tablet (500 mg) by mouth 2 times daily    Vitamin D deficiency  refilled  - vitamin D3 (CHOLECALCIFEROL) 50 mcg (2000 units) tablet; Take 1 tablet (50 mcg) by mouth daily    Morbid obesity (H)  Has lost some weight.  This will help her diabetes    Nasal congestion  Suspect from allergies.  Start zyrtec and nasal spray.  Encouraged pt to stop the afrin.    - cetirizine (ZYRTEC) 10 MG tablet; Take 1 tablet (10 mg) by mouth daily  - fluticasone (FLONASE) 50 MCG/ACT nasal spray; Spray 1 spray into both nostrils daily    Seasonal allergic rhinitis, unspecified trigger  As above  - cetirizine (ZYRTEC) 10 MG tablet; Take 1 tablet (10 mg) by mouth daily  - fluticasone (FLONASE) 50 MCG/ACT nasal spray; Spray 1 spray into both nostrils daily                 [facility] KRANTHI Guzmán is a 43 year old, presenting for the following health issues:  Cold Symptoms (Nasal congestion, runny nose, unable to sleep x 3 weeks)        5/10/2023    11:56 AM   Additional Questions   Roomed by Ludy SANDERS CMA         5/10/2023    11:56 AM   Patient Reported Additional Medications   Patient reports taking the following new medications none     HPI     Acute Illness  Acute illness concerns: nasal congestion, runny nose and unable to sleep  Onset/Duration: 3 weeks  Symptoms:  Fever: No  Chills/Sweats: No  Headache (location?): No  Sinus Pressure: No  Conjunctivitis:  No  Ear Pain: no  Rhinorrhea: YES  Congestion: YES  Sore Throat: No  Cough: no  Wheeze: No  Decreased Appetite: No  Nausea: No  Vomiting: No  Diarrhea: No  Dysuria/Freq.: No  Dysuria or Hematuria:  "No  Fatigue/Achiness: No  Sick/Strep Exposure: No  Therapies tried and outcome: Nasal Spray OTC-aftrin-didn't help     Some sneezing but mostly nasal congestion.  Hard to sleep.   No headaches     Eye exam Alan     Review of Systems   As above      Objective    /79   Pulse 70   Temp 98.1  F (36.7  C) (Oral)   Ht 1.626 m (5' 4.02\")   Wt 97.9 kg (215 lb 12.8 oz)   LMP 05/06/2023 (Exact Date)   SpO2 100%   BMI 37.02 kg/m    Body mass index is 37.02 kg/m .  Physical Exam  Constitutional:       General: She is not in acute distress.  HENT:      Right Ear: Tympanic membrane, ear canal and external ear normal.      Left Ear: Ear canal normal.      Ears:      Comments: Left tm landmarks are not in normal position -appears there is fluid behind tm-pt aware and this is not new     Nose: Congestion present.   Cardiovascular:      Rate and Rhythm: Normal rate and regular rhythm.   Pulmonary:      Effort: Pulmonary effort is normal.      Breath sounds: Normal breath sounds.   Lymphadenopathy:      Cervical: No cervical adenopathy.   Neurological:      Mental Status: She is alert.                              "

## 2023-05-10 NOTE — PATIENT INSTRUCTIONS
Can try sudafed at night for the nasal congestion   Try zyrtec nightly   Nasal spray called Flonase once daily  Look to see if you have the paper work from the eye exam Patient Education

## 2023-05-12 ENCOUNTER — TELEPHONE (OUTPATIENT)
Dept: FAMILY MEDICINE | Facility: CLINIC | Age: 43
End: 2023-05-12

## 2023-05-12 DIAGNOSIS — R09.81 NASAL CONGESTION: Primary | ICD-10-CM

## 2023-05-12 RX ORDER — LORATADINE 10 MG/1
10 TABLET ORAL DAILY
Qty: 90 TABLET | Refills: 0 | Status: SHIPPED | OUTPATIENT
Start: 2023-05-12

## 2023-05-12 NOTE — TELEPHONE ENCOUNTER
Patient calling in, states that she was seen in clinic 5/10/23 for nasal congestion and seasonal allergies. Instructed to start the following:  - cetirizine (ZYRTEC) 10 MG tablet; Take 1 tablet (10 mg) by mouth daily  - fluticasone (FLONASE) 50 MCG/ACT nasal spray; Spray 1 spray into both nostrils daily    Patient states that she started these medications with no relief. States that she tried Claritin and this worked better for her.    Asking if provider can prescribe claritin instead to patient. Patient knows this can be bought OTC but would prefer to have this covered by insurance.    Routing to provider seen to please advise.    Preferred pharmacy: [id number] - [place], [place] - [address] AT [address]    EUSEBIO Overton RN  [facility]

## 2023-05-14 ENCOUNTER — HOSPITAL ENCOUNTER (EMERGENCY)
Facility: CLINIC | Age: 43
Discharge: HOME | End: 2023-05-14
Attending: EMERGENCY MEDICINE | Admitting: EMERGENCY MEDICINE
Payer: COMMERCIAL

## 2023-05-14 VITALS
DIASTOLIC BLOOD PRESSURE: 75 MMHG | WEIGHT: 216 LBS | HEIGHT: 66 IN | RESPIRATION RATE: 18 BRPM | HEART RATE: 58 BPM | SYSTOLIC BLOOD PRESSURE: 107 MMHG | TEMPERATURE: 97.1 F | BODY MASS INDEX: 34.72 KG/M2 | OXYGEN SATURATION: 100 %

## 2023-05-14 DIAGNOSIS — R09.81 NASAL CONGESTION: ICD-10-CM

## 2023-05-14 PROCEDURE — 99284 EMERGENCY DEPT VISIT MOD MDM: CPT | Performed by: EMERGENCY MEDICINE

## 2023-05-14 PROCEDURE — 99283 EMERGENCY DEPT VISIT LOW MDM: CPT | Performed by: EMERGENCY MEDICINE

## 2023-05-14 RX ORDER — TRIAMCINOLONE ACETONIDE 55 UG/1
2 SPRAY, METERED NASAL DAILY
Qty: 10.8 ML | Refills: 0 | Status: SHIPPED | OUTPATIENT
Start: 2023-05-14

## 2023-05-14 RX ORDER — LORATADINE 10 MG/1
10 TABLET ORAL DAILY
Qty: 10 TABLET | Refills: 0 | Status: SHIPPED | OUTPATIENT
Start: 2023-05-14

## 2023-05-14 ASSESSMENT — ACTIVITIES OF DAILY LIVING (ADL)
ADLS_ACUITY_SCORE: 33
ADLS_ACUITY_SCORE: 35

## 2023-05-14 NOTE — DISCHARGE INSTRUCTIONS
Try Claritin to see if this works better with your symptoms.  Switch to Nasacort nasal spray.  Some people use Neti pots for nasal congestion.  If you do use a [place] pot make sure you use distilled water in it.  Please make an appointment to follow up with [facility] (phone: [phone]) ENT doctors are the specialists in nasal complaints.  Follow-up with your regular doctor within a week for reevaluation

## 2023-05-14 NOTE — ED PROVIDER NOTES
[facility] EMERGENCY DEPARTMENT ([facility])    5/14/23      ED PROVIDER NOTE      History     Chief Complaint   Patient presents with     Nasal Congestion     Nasal congestion x 1 month. Pt has seen PCP, given cetirizine and flonase. She feels like the medications aren't working and didn't sleep well last night.     The history is provided by the patient and medical records.     Arielle Paul is a 43 year old female with a past medical history significant for DM2 without long-term use of insulin, nasal congestion, and seasonal allergic rhinitis presenting to the ED for evaluation of nasal congestion.  Patient states that she has been experiencing nasal congestion for the past month, however it has worsened in the past 2 weeks. She was seen by her PCP on 5/10 where she was prescribed cetirizine and Flonase, but states that these have been offering no relief.  Patient denies cough, fever, history of smoking, or chance of pregnancy.    Past Medical History  Past Medical History:   Diagnosis Date     Abnormal maternal glucose tolerance, complicating pregnancy, childbirth, or the puerperium, unspecified as to episode of care 2/17/2006    LGA ?.  Refused 3 hr. GTT, to get Diabetic teaching and to start accucheck.     Anemia      Headache(784.0)      Past Surgical History:   Procedure Laterality Date     NO HISTORY OF SURGERY       cetirizine (ZYRTEC) 10 MG tablet  fluticasone (FLONASE) 50 MCG/ACT nasal spray  loratadine (CLARITIN) 10 MG tablet  metFORMIN (GLUCOPHAGE) 500 MG tablet  triamcinolone (NASACORT) 55 MCG/ACT nasal aerosol  alcohol swab prep pads  blood glucose (NO BRAND SPECIFIED) test strip  blood glucose calibration (NO BRAND SPECIFIED) solution  blood glucose monitoring (NO BRAND SPECIFIED) meter device kit  calcium carbonate (TUMS) 500 MG chewable tablet  loratadine (CLARITIN) 10 MG tablet  omeprazole (PRILOSEC) 20 MG DR capsule  ondansetron (ZOFRAN ODT) 4 MG ODT tab  thin (NO BRAND SPECIFIED)  "lancets  vitamin D3 (CHOLECALCIFEROL) 50 mcg (2000 units) tablet      Allergies   Allergen Reactions     Bactrim [Sulfamethoxazole-Trimethoprim] Rash     Family History  Family History   Problem Relation Age of Onset     Family History Negative No family hx of      Autoimmune Disease No family hx of      Social History   Social History     Tobacco Use     Smoking status: Never     Smokeless tobacco: Never   Substance Use Topics     Alcohol use: No     Drug use: No      Past medical history, past surgical history, medications, allergies, family history, and social history were reviewed with the patient. No additional pertinent items.      A medically appropriate review of systems was performed with pertinent positives and negatives noted in the HPI, and all other systems negative.    Physical Exam   BP: 106/61  Pulse: 64  Temp: 97.9  F (36.6  C)  Resp: 20  Height: 167.6 cm (5' 6\")  Weight: 98 kg (216 lb)  SpO2: 100 %  Physical Exam  Physical Exam   Constitutional:   well nourished, well developed, resting comfortably   HENT:   Head: Normocephalic and atraumatic.   Eyes: Conjunctivae are normal. Pupils are equal, round, and reactive to light.   Nose: clear discharge, pharynx has no erythema or exudate, mucous membranes are moist, no trismus, uvula midline, normal phonation  No tenderness to palpation over sinuses  Neck:   no adenopathy, no bony tenderness  Cardiovascular: regular rate and rhythm without murmurs or gallops  Pulmonary/Chest: Clear to auscultation bilaterally, with no wheezes or retractions. No respiratory distress.  Normal work of breathing  GI: Soft with good bowel sounds.  Non-tender, non-distended, with no guarding, no rebound, no peritoneal signs.   Back:  No bony or CVA tenderness   Musculoskeletal:  no edema  Skin: Skin is warm and dry.   Neurological: alert and oriented to person, place, and time. Nonfocal exam  Psychiatric:  normal mood and affect.     ED Course, Procedures, & Data     10:43 AM  " The patient was seen and examined by Carmina Lauren   in [facility].     Procedures                     No results found for any visits on 05/14/23.  Medications - No data to display  Labs Ordered and Resulted from Time of ED Arrival to Time of ED Departure - No data to display  No orders to display          Critical care was not performed.     Medical Decision Making  The patient's presentation was of moderate complexity (a chronic illness mild to moderate exacerbation, progression, or side effect of treatment).    The patient's evaluation involved:  review of external note(s) from 2 sources (prior office and ED notes)    The patient's management necessitated moderate risk (prescription drug management including medications given in the ED).      Assessment & Plan        I have reviewed the nursing notes.  Emergency Department course:  The patient was seen and examined at 1044 am in [facility]  Arielle Paul is a 43 year old female who presents with nasal congestion and runny nose for a month.  She states symptoms come and go.  Differential includes allergic rhinitis, sinusitis, polyps, foreign body.  She denies foreign body.  She has been using sertraline and Flonase without relief.  She has no fevers.  She is nontoxic-appearing with normal vital signs.  Her nasal discharge is clear  The patient requested Claritin.  I provided her with a prescription for Claritin although I note that she does have a prescription called into [facility] pharmacy.  In addition, I prescribed Nasacort nasal spray to use as an alternative to Flonase..  I did tell the patient about the availability of Neti Pots which are available over-the-counter.  If she is to use a Neti pot, she should use distilled water.  I suspect she has allergic rhinitis.  The patient does have a primary care physician with whom she can follow-up.  I would like her to do so within 1 to 2 weeks.  I also gave her the clinic number to ENT should her symptoms  persist.  I counseled the patient in my usual and standard fashion for nasal congestion and reasons to return to the Emergency Department.       I have reviewed the findings, diagnosis, plan and need for follow up with the patient.    Discharge Medication List as of 5/14/2023 11:05 AM      START taking these medications    Details   !! loratadine (CLARITIN) 10 MG tablet Take 1 tablet (10 mg) by mouth daily, Disp-10 tablet, R-0, Local Print      triamcinolone (NASACORT) 55 MCG/ACT nasal aerosol Spray 2 sprays into both nostrils daily, Disp-10.8 mL, R-0, Local Print       !! - Potential duplicate medications found. Please discuss with provider.          Final diagnoses:   Nasal congestion   This note was created in part by the use of Dragon voice recognition dictation system. Inadvertent grammatical errors and typographical errors may still exist.    I, Dolores Artis, am serving as a trained medical scribe to document services personally performed by Carmina Lauren MD, based on the provider's statements to me.     I, Carmina Lauren MD, was physically present and have reviewed and verified the accuracy of this note documented by Dolores Artis.      Carmina Lauren MD  [facility] EMERGENCY DEPARTMENT  5/14/2023     Carmina Lauren MD  05/14/23 [id number]

## 2023-05-14 NOTE — ED TRIAGE NOTES
Nasal Congestion Nasal congestion x 1 month. Pt has seen PCP, given cetirizine and flonase. She feels like the medications aren't working and didn't sleep well last night.          Triage Assessment     Row Name 05/14/23 0861       Triage Assessment (Adult)    Airway WDL WDL       Respiratory WDL    Respiratory WDL WDL       Skin Circulation/Temperature WDL    Skin Circulation/Temperature WDL WDL       Cardiac WDL    Cardiac WDL WDL       Peripheral/Neurovascular WDL    Peripheral Neurovascular WDL WDL       Cognitive/Neuro/Behavioral WDL    Cognitive/Neuro/Behavioral WDL WDL

## 2024-03-18 ENCOUNTER — TELEPHONE (OUTPATIENT)
Dept: FAMILY MEDICINE | Facility: CLINIC | Age: 44
End: 2024-03-18
Payer: COMMERCIAL

## 2024-03-18 NOTE — TELEPHONE ENCOUNTER
Patient Quality Outreach    Patient is due for the following:   Breast Cancer Screening - Mammogram  Cervical Cancer Screening - PAP Needed  Physical Preventive Adult Physical    Next Steps:   Schedule a Adult Preventative    Type of outreach:    Phone, left message for patient/parent to call back.    Next Steps:  Reach out within 90 days via Phone.    Max number of attempts reached: Yes. Will try again in 90 days if patient still on fail list.    Questions for provider review:    None           Kaushik Schilling CMA  Chart routed to .

## 2024-05-23 ENCOUNTER — TELEPHONE (OUTPATIENT)
Dept: FAMILY MEDICINE | Facility: CLINIC | Age: 44
End: 2024-05-23
Payer: COMMERCIAL

## 2024-05-23 NOTE — LETTER
May 23, 2024      Arielle Paul  [address]   [place] [address]    Your team at [facility] cares about your health. We have reviewed your chart and based on our findings; we are making the following recommendations to better manage your health.     You are in particular need of attention regarding the following:     Schedule Annual MAMMOGRAPHY. [facility] scheduling number is [phone] or schedule in [facility] (self referral).  Schedule a primary care office visit with your provider for a Pap Smear to screen for Cervical Cancer.  PREVENTATIVE VISIT: Physical    If you have already completed these items, please contact the clinic via phone or   PANOSOL[facility] so your care team can review and update your records. Thank you for   choosing [facility] for your healthcare needs. For any questions,   concerns, or to schedule an appointment please contact our clinic.    Healthy Regards,      Your [facility] Care Team

## 2024-05-23 NOTE — TELEPHONE ENCOUNTER
Patient Quality Outreach    Patient is due for the following:   Breast Cancer Screening - Mammogram  Cervical Cancer Screening - PAP Needed  Physical Preventive Adult Physical    Next Steps:   Schedule a Adult Preventative    Type of outreach:    Sent letter.    Next Steps:  Reach out within 90 days via Letter.    Max number of attempts reached: Yes. Will try again in 90 days if patient still on fail list.    Questions for provider review:    None           Kaushik Schilling CMA

## 2025-05-13 NOTE — PROGRESS NOTES
SUBJECTIVE:                                                   Arielle Paul is a 45 year old female who presents to clinic today for the following health issue(s):  Patient presents with:  Abnormal Bleeding Problem: Patient states she has been experiencing more frequent periods. 03/28/2025 that lasted until 4/18/25 than stopped and came back 5/5/2025 very heavy and blood clots lasting 5 days. Patient denies changes in cramping and is not having abdominal pain. Patient reports the bleeding had an odor.     HPI:  [employer] [place] translation offered and declined for use during this visit.     Arielle is a 46 yo female with PMHx DM2, iron deficiency anemia, vitamin d deficiency who presents with irregular menstrual bleeding x 2 months.     She reports her periods are typically monthly, every 28-30 days, 5 days in length with 3 days heavier bleeding, 2 days lighter flow, minimal cramping.     In March, she began menses at her normal time 3/28 but had continued bleeding with light-moderate flow until 4/18. After this extended period, she had cessation of bleeding until 5/5 when bleeding return heavier than her typical menses with more blood clots and increased odor.     Since cessation of menstrual bleeding she has had increased vaginal discharge, thin and watery, and increased vaginal odor.     She had a pelvic ultrasound completed with PCP (transabdominal per patient request) which was normal. She denies any fever/chills/night sweats. She is sexually active with one male partner, but reports has not been sexually active for the past 3 months due to irregular bleeding.     She has not had any changes in diet, stress levels, physical activity, sexual activity, or medications. Denies vaginal irritation and itching. Denies urinary symptoms. Denies fever/chills/night sweats.     She is being treated for vitamin D deficiency. Requests calcium level check today given home supplementation. Also wonders about iron levels with hx iron  deficiency and increased bleeding.     Patient's last menstrual period was 2025..     Patient is sexually active, .  Using none for contraception.    reports that she has never smoked. She has never been exposed to tobacco smoke. She has never used smokeless tobacco.  STD testing offered?  Declined    Health maintenance updated:  yes    Today's PHQ-2 Score:       2025    10:13 AM   PHQ-2 (  [employer])   Q1: Little interest or pleasure in doing things 0   Q2: Feeling down, depressed or hopeless 0   PHQ-2 Score 0     Today's PHQ-9 Score:       2011    10:45 AM   PHQ-9 SCORE   PHQ-9 Total Score 1     Today's CAROLIN-7 Score:        No data to display                Problem list and histories reviewed & adjusted, as indicated.  Additional history: as documented.    Patient Active Problem List   Diagnosis    Anemia    Perforated tympanic membrane    Vitamin D deficiency    Edema    Contraception, generic surveillance    Migraine    Polyarthritis    Thyroid nodule    Corns and callosities    Diabetes mellitus, type 2 (H)    Morbid obesity (H)    Acquired abnormality of left ear ossicles    Iron deficiency anemia, unspecified iron deficiency anemia type     Past Surgical History:   Procedure Laterality Date    NO HISTORY OF SURGERY        Social History     Tobacco Use    Smoking status: Never     Passive exposure: Never    Smokeless tobacco: Never   Substance Use Topics    Alcohol use: No           Negative family history of: Family History Negative, Autoimmune Disease              Current Outpatient Medications   Medication Sig Dispense Refill    calcium carbonate (TUMS) 500 MG chewable tablet Take 1 tablet (500 mg) by mouth 2 times daily 90 tablet 1    metFORMIN (GLUCOPHAGE) 500 MG tablet Start with 1 tab each am times 1 week, then 1 tab in the morning and 1 in the the evening times 1 week , Then 2 tabs am and 1 tab each evening times 1 week Then finally 2 tabs 2 times a day which is the maintenance  dose 120 tablet 3    vitamin D3 (CHOLECALCIFEROL) 50 mcg (2000 units) tablet Take 1 tablet (50 mcg) by mouth daily 90 tablet 3    alcohol swab prep pads Use to swab area of injection/jasmine as directed. 100 each 3    blood glucose (NO BRAND SPECIFIED) test strip Use to test blood sugar 2 times daily or as directed. To accompany: Blood Glucose Monitor Brands: per insurance. 100 strip 6    blood glucose calibration (NO BRAND SPECIFIED) solution To accompany: Blood Glucose Monitor Brands: per insurance. 3 each 1    blood glucose monitoring (NO BRAND SPECIFIED) meter device kit Use to test blood sugar 2 times daily or as directed. Preferred blood glucose meter OR supplies to accompany: Blood Glucose Monitor Brands: per insurance. 1 kit 0    cetirizine (ZYRTEC) 10 MG tablet Take 1 tablet (10 mg) by mouth daily (Patient not taking: Reported on 5/14/2025) 90 tablet 1    fluticasone (FLONASE) 50 MCG/ACT nasal spray Spray 1 spray into both nostrils daily (Patient not taking: Reported on 5/14/2025) 16 g 1    loratadine (CLARITIN) 10 MG tablet Take 1 tablet (10 mg) by mouth daily (Patient not taking: Reported on 5/14/2025) 10 tablet 0    loratadine (CLARITIN) 10 MG tablet Take 1 tablet (10 mg) by mouth daily (Patient not taking: Reported on 5/14/2025) 90 tablet 0    omeprazole (PRILOSEC) 20 MG DR capsule Take 1 capsule (20 mg) by mouth daily (Patient not taking: Reported on 5/14/2025) 30 capsule 2    ondansetron (ZOFRAN ODT) 4 MG ODT tab Take 1 tablet (4 mg) by mouth every 8 hours as needed for nausea or vomiting (Patient not taking: Reported on 5/14/2025) 10 tablet 3    thin (NO BRAND SPECIFIED) lancets Use with lanceting device. To accompany: Blood Glucose Monitor Brands: per insurance. 100 each 6    triamcinolone (NASACORT) 55 MCG/ACT nasal aerosol Spray 2 sprays into both nostrils daily (Patient not taking: Reported on 5/14/2025) 10.8 mL 0     No current facility-administered medications for this visit.     Allergies    Allergen Reactions    Bactrim [Sulfamethoxazole-Trimethoprim] Rash     OBJECTIVE:     /70 (BP Location: Right arm, Patient Position: Sitting, Cuff Size: Adult Large)   Wt 104.5 kg (230 lb 6.4 oz)   LMP 05/05/2025   BMI 37.19 kg/m    Body mass index is 37.19 kg/m .    Exam:  Constitutional:  Appearance: Well nourished, well developed alert, in no acute distress  Neurologic:  Mental Status:  Oriented X3.  Normal strength and tone, sensory exam grossly normal, mentation intact and speech normal.    Psychiatric:  Mentation appears normal and affect normal/bright.  Pelvic Exam:  External Genitalia:     Normal appearance for age, no discharge present, no tenderness present, no inflammatory lesions present, color normal  Vagina:     Normal vaginal vault without central or paravaginal defects, moderate yellow discharge present, no inflammatory lesions present, no masses present  Bladder:     Nontender to palpation  Urethra:   Urethral Body:  Urethra palpation normal, urethra structural support normal   Urethral Meatus:  No erythema or lesions present  Cervix:     Appearance healthy, no lesions present, nontender to palpation, no bleeding present, PAP collected  Uterus:     Uterus: firm, normal sized and nontender  Adnexa:     No adnexal tenderness present, no adnexal masses present  Perineum:     Perineum within normal limits, no evidence of trauma, no rashes or skin lesions present  Anus:     Anus within normal limits, no hemorrhoids present  Inguinal Lymph Nodes:     No lymphadenopathy present  Pubic Hair:     Normal pubic hair distribution for age  Genitalia and Groin:     No rashes present, no lesions present, no areas of discoloration, no masses present     In-Clinic Test Results:  Results for orders placed or performed in visit on 05/14/25 (from the past 24 hours)   Hemoglobin with Reflex to Iron Studies    Narrative    The following orders were created for panel order Hemoglobin with Reflex to Iron  Studies.  Procedure                               Abnormality         Status                     ---------                               -----------         ------                     Hemoglobin with Reflex ...[[id number]]  Abnormal            Final result               Extra Green Top (Lithiu...[[id number]]                      In process                   Please view results for these tests on the individual orders.   Hemoglobin with Reflex to Iron Studies   Result Value Ref Range    Hemoglobin 10.1 (L) 11.7 - 15.7 g/dL    MCV 83 78 - 100 fL       ASSESSMENT/PLAN:                                                        ICD-10-CM    1. Irregular menstrual bleeding  N92.6 Follicle stimulating hormone     Estradiol     hCG Quantitative Pregnancy     HPV and Gynecologic Cytology Panel - Recommended Age 30-65 Years     Multiplex Vaginal Panel by PCR     Hemoglobin with Reflex to Iron Studies     Follicle stimulating hormone     Estradiol     hCG Quantitative Pregnancy     Hemoglobin with Reflex to Iron Studies     Multiplex Vaginal Panel by PCR     Iron & Iron Binding Capacity     Ferritin      2. Calcium deficiency  E58 Calcium     Calcium          Patient Instructions   Thank you for visiting the [facility].    Today we discussed irregular menstrual bleeding. We completed testing for vaginal infections, hormone levels, changes to cervical cells, and iron levels. You will receive your results via [employer] along with treatment recommendations as soon as they are complete.      Please do not hesitate to contact the office if you have any questions or concerns.     Arielle is a 46 yo female with PMHx DM2, iron deficiency anemia, vitamin d deficiency who presents with irregular menstrual bleeding x 2 months.     Irregular Menstrual Bleeding  - normal pelvic US, no structural findings on vaginal exam   - discussed changes to bleeding as a results of lifestyle (diet/stress/exercise), medications,  underlying health conditions (diabetes), hormonal fluctuations in the perimenopausal periods (average age 45-55), infections, etc  - MVP swab collected, results via phone or MyChart  - STI screening offered and declined  - HCG to r/o pregnancy given sexually activity without contraception prior to onset of bleeding   - PAP collected, last completed 2011  - FSH and E2 ordered to assess for hormonal changes, snapshot in time only not definitive diagnosis   - Hgb/iron studies ordered given hx iron deficiency     ADDENDUM: hgb 10.1, will send oral iron supplement     Vitamins/Supplements  - Calcium ordered per patient request     [facility] [place]

## 2025-05-14 ENCOUNTER — RESULTS FOLLOW-UP (OUTPATIENT)
Dept: OBGYN | Facility: CLINIC | Age: 45
End: 2025-05-14

## 2025-05-14 ENCOUNTER — OFFICE VISIT (OUTPATIENT)
Dept: OBGYN | Facility: CLINIC | Age: 45
End: 2025-05-14
Payer: COMMERCIAL

## 2025-05-14 VITALS — DIASTOLIC BLOOD PRESSURE: 70 MMHG | WEIGHT: 230.4 LBS | BODY MASS INDEX: 37.19 KG/M2 | SYSTOLIC BLOOD PRESSURE: 102 MMHG

## 2025-05-14 DIAGNOSIS — N92.6 IRREGULAR MENSTRUAL BLEEDING: Primary | ICD-10-CM

## 2025-05-14 DIAGNOSIS — B37.31 VAGINAL YEAST INFECTION: Primary | ICD-10-CM

## 2025-05-14 DIAGNOSIS — D50.9 IRON DEFICIENCY ANEMIA, UNSPECIFIED IRON DEFICIENCY ANEMIA TYPE: ICD-10-CM

## 2025-05-14 DIAGNOSIS — E58 CALCIUM DEFICIENCY: ICD-10-CM

## 2025-05-14 LAB
BACTERIAL VAGINOSIS VAG-IMP: NEGATIVE
CANDIDA DNA VAG QL NAA+PROBE: NOT DETECTED
CANDIDA GLABRATA / CANDIDA KRUSEI DNA: DETECTED
HGB BLD-MCNC: 10.1 G/DL (ref 11.7–15.7)
HOLD SPECIMEN: NORMAL
MCV RBC AUTO: 83 FL (ref 78–100)
T VAGINALIS DNA VAG QL NAA+PROBE: NOT DETECTED

## 2025-05-14 PROCEDURE — 3074F SYST BP LT 130 MM HG: CPT

## 2025-05-14 PROCEDURE — 3078F DIAST BP <80 MM HG: CPT

## 2025-05-14 PROCEDURE — 83540 ASSAY OF IRON: CPT

## 2025-05-14 PROCEDURE — 83001 ASSAY OF GONADOTROPIN (FSH): CPT

## 2025-05-14 PROCEDURE — 99203 OFFICE O/P NEW LOW 30 MIN: CPT

## 2025-05-14 PROCEDURE — 83550 IRON BINDING TEST: CPT

## 2025-05-14 PROCEDURE — 82310 ASSAY OF CALCIUM: CPT

## 2025-05-14 PROCEDURE — 85018 HEMOGLOBIN: CPT

## 2025-05-14 PROCEDURE — 87624 HPV HI-RISK TYP POOLED RSLT: CPT

## 2025-05-14 PROCEDURE — 84702 CHORIONIC GONADOTROPIN TEST: CPT

## 2025-05-14 PROCEDURE — 82728 ASSAY OF FERRITIN: CPT

## 2025-05-14 PROCEDURE — 36415 COLL VENOUS BLD VENIPUNCTURE: CPT

## 2025-05-14 PROCEDURE — 81515 NFCT DS BV&VAGINITIS DNA ALG: CPT

## 2025-05-14 PROCEDURE — 82670 ASSAY OF TOTAL ESTRADIOL: CPT

## 2025-05-14 RX ORDER — FERROUS SULFATE 325(65) MG
325 TABLET ORAL
Qty: 90 TABLET | Refills: 3 | Status: SHIPPED | OUTPATIENT
Start: 2025-05-14

## 2025-05-14 NOTE — PATIENT INSTRUCTIONS
Thank you for visiting the [facility].    Today we discussed irregular menstrual bleeding. We completed testing for vaginal infections, hormone levels, changes to cervical cells, and iron levels. You will receive your results via [employer] along with treatment recommendations as soon as they are complete.      Please do not hesitate to contact the office if you have any questions or concerns.

## 2025-05-15 LAB
CALCIUM SERPL-MCNC: 9.1 MG/DL (ref 8.8–10.4)
ESTRADIOL SERPL-MCNC: 48 PG/ML
FERRITIN SERPL-MCNC: 15 NG/ML (ref 6–175)
FSH SERPL IRP2-ACNC: 25.5 MIU/ML
HCG INTACT+B SERPL-ACNC: 1 MIU/ML
HPV HR 12 DNA CVX QL NAA+PROBE: NEGATIVE
HPV16 DNA CVX QL NAA+PROBE: NEGATIVE
HPV18 DNA CVX QL NAA+PROBE: NEGATIVE
HUMAN PAPILLOMA VIRUS FINAL DIAGNOSIS: NORMAL
IRON BINDING CAPACITY (ROCHE): 365 UG/DL (ref 240–430)
IRON SATN MFR SERPL: 9 % (ref 15–46)
IRON SERPL-MCNC: 34 UG/DL (ref 37–145)

## 2025-05-19 LAB
BKR AP ASSOCIATED HPV REPORT: NORMAL
BKR LAB AP GYN ADEQUACY: NORMAL
BKR LAB AP GYN INTERPRETATION: NORMAL
BKR LAB AP LMP: NORMAL
BKR LAB AP PREVIOUS ABNORMAL: NORMAL
PATH REPORT.COMMENTS IMP SPEC: NORMAL
PATH REPORT.COMMENTS IMP SPEC: NORMAL
PATH REPORT.RELEVANT HX SPEC: NORMAL

## 2025-06-04 NOTE — TELEPHONE ENCOUNTER
Pt calling with questions about nystatin cream Rx  No applicator with Rx  Pt to notify pharmacy then complete treatment.  Make appt to be seen if symptoms not resolved.  Pt verbalized understanding, in agreement with plan, and voiced no further questions.  Jordon Brenner RN on 6/4/2025 at 12:01 PM   [facility]

## 2025-06-23 ENCOUNTER — TELEPHONE (OUTPATIENT)
Dept: LAB | Facility: CLINIC | Age: 45
End: 2025-06-23
Payer: COMMERCIAL

## 2025-06-23 NOTE — TELEPHONE ENCOUNTER
Reason for Call:  Appointment Request    Patient requesting this type of appt: Chronic Diease Management/Medication/Follow-Up    Requested provider: Outside, Provider    Reason patient unable to be scheduled: Not within requested timeframe    When does patient want to be seen/preferred time: Same day    Comments: possible vaginal infection but pt refused to provide detail    Okay to leave a detailed message?: Yes at Cell number on file:    Telephone Information:   Mobile [phone]       Call taken on 6/23/2025 at 12:37 PM by Brian Ford

## 2025-06-25 NOTE — TELEPHONE ENCOUNTER
Attempt #1 to call patient.     RN left voicemail (with use of ) and requested return call to [facility] at [phone].     EUSEBIO Bruno  [place] Triage RN  [facility]

## 2025-06-27 ENCOUNTER — NURSE TRIAGE (OUTPATIENT)
Dept: FAMILY MEDICINE | Facility: CLINIC | Age: 45
End: 2025-06-27

## 2025-06-27 NOTE — TELEPHONE ENCOUNTER
"Nurse Triage SBAR    Is this a 2nd Level Triage? NO    Situation: Pt calling to state she has a yeast infection and would like a follow up.     Background: Pt seen 5/30/25 with OB/Gyn and given nystatin cream for yeast infection.     Assessment: Pt states she continues with clear vaginal discharge with odor and states her underwear is wet. Pt did not use prescribed cream. No pain, no itching, no fever    Protocol Recommended Disposition:   SEE IN OFFICE OR VIDEO VISIT TODAY: no female provider available for in person visit at [facility] today. Pt warm transferred to Central scheduling for available appointment today. Pt is scheduled for 6/27/25 for follow up.     Lauren Hernandez RN on 6/27/2025 at 11:39 AM     Reason for Disposition   Vaginal discharge is main symptom   Patient wants to be seen    Additional Information   Negative: Pain or burning with passing urine (urination) is main symptom   Negative: Pregnant with vaginal discharge   Negative: SEVERE abdominal pain (e.g., excruciating)   Negative: Patient sounds very sick or weak to the triager   Negative: Yellow or green vaginal discharge and has a fever   Negative: Constant abdominal pain lasting > 2 hours   Negative: Sounds like a life-threatening emergency to the triager   Negative: Followed a genital area injury (e.g., vagina, vulva)   Negative: Vaginal bleeding is main symptom   Negative: Mild lower abdominal pain comes and goes (cramps) that lasts > 24 hours   Negative: Genital area looks infected (e.g., draining sore, spreading redness)   Negative: Rash is tiny water blisters (3 or more)    Answer Assessment - Initial Assessment Questions  1. DISCHARGE: \"Describe the discharge.\" (e.g., white, yellow, green, gray, foamy, cottage cheese-like)      Clear   2. ODOR: \"Is there a bad odor?\"      odor  3. ONSET: \"When did the discharge begin?\"      A month now  4. RASH: \"Is there a rash in the genital area?\" If Yes, ask: \"Describe it.\" (e.g., redness, blisters, sores, " "bumps)      no  5. ABDOMEN PAIN: \"Are you having any abdomen pain?\" If Yes, ask: \"What does it feel like? \" (e.g., crampy, dull, intermittent, constant)       no  6. ABDOMEN PAIN SEVERITY: If present, ask: \"How bad is it?\" (e.g., Scale 1-10; mild, moderate, or severe)      no  7. CAUSE: \"What do you think is causing the discharge?\" \"Have you had the same problem before?\" \"What happened then?\"      Unknown   8. OTHER SYMPTOMS: \"Do you have any other symptoms?\" (e.g., fever, itching, vaginal bleeding, pain with urination, injury to genital area, vaginal foreign body)      odor  9. PREGNANCY: \"Is there any chance you are pregnant?\" \"When was your last menstrual period?\"      no    Answer Assessment - Initial Assessment Questions  1. SYMPTOM: \"What's the main symptom you're concerned about?\" (e.g., pain, itching, dryness)      Odor   2. LOCATION: \"Where is the  odor located?\" (e.g., inside/outside, left/right)      vagina  3. ONSET: \"When did the  odor  start?\"      A month now   4. PAIN: \"Is there any pain?\" If Yes, ask: \"How bad is it?\" (Scale: 1-10; mild, moderate, severe)      No   5. ITCHING: \"Is there any itching?\" If Yes, ask: \"How bad is it?\" (Scale: 1-10; mild, moderate, severe)      no  6. CAUSE: \"What do you think is causing the discharge?\" \"Have you had the same problem before?\" \"What happened then?\"      Unknown   7. OTHER SYMPTOMS: \"Do you have any other symptoms?\" (e.g., fever, itching, vaginal bleeding, pain with urination, injury to genital area, vaginal foreign body)      Vaginal discharge   8. PREGNANCY: \"Is there any chance you are pregnant?\" \"When was your last menstrual period?\"      no    Protocols used: Vaginal Symptoms-A-OH, Vaginal [id number]    "

## 2025-07-01 NOTE — TELEPHONE ENCOUNTER
Please see 6/27/25 triage for vaginal problem. Closing encounter.     Thanks,  WHITNEY Roberts  [facility]